# Patient Record
Sex: FEMALE | Race: WHITE | NOT HISPANIC OR LATINO | Employment: OTHER
[De-identification: names, ages, dates, MRNs, and addresses within clinical notes are randomized per-mention and may not be internally consistent; named-entity substitution may affect disease eponyms.]

---

## 2017-06-10 ENCOUNTER — HEALTH MAINTENANCE LETTER (OUTPATIENT)
Age: 62
End: 2017-06-10

## 2017-06-12 ENCOUNTER — HOSPITAL ENCOUNTER (OUTPATIENT)
Dept: MAMMOGRAPHY | Facility: CLINIC | Age: 62
Discharge: HOME | End: 2017-06-12
Attending: OBSTETRICS & GYNECOLOGY | Admitting: OBSTETRICS & GYNECOLOGY
Payer: COMMERCIAL

## 2017-06-12 DIAGNOSIS — Z12.31 VISIT FOR SCREENING MAMMOGRAM: ICD-10-CM

## 2017-06-12 PROCEDURE — 77063 BREAST TOMOSYNTHESIS BI: CPT

## 2017-10-02 ENCOUNTER — HOSPITAL ENCOUNTER (OUTPATIENT)
Facility: CLINIC | Age: 62
Discharge: HOME | End: 2017-10-02
Attending: COLON & RECTAL SURGERY | Admitting: COLON & RECTAL SURGERY
Payer: COMMERCIAL

## 2017-10-02 VITALS
DIASTOLIC BLOOD PRESSURE: 80 MMHG | WEIGHT: 145 LBS | OXYGEN SATURATION: 96 % | BODY MASS INDEX: 24.16 KG/M2 | SYSTOLIC BLOOD PRESSURE: 113 MMHG | HEART RATE: 60 BPM | RESPIRATION RATE: 15 BRPM | HEIGHT: 65 IN

## 2017-10-02 LAB — COLONOSCOPY: NORMAL

## 2017-10-02 PROCEDURE — 45378 DIAGNOSTIC COLONOSCOPY: CPT | Performed by: COLON & RECTAL SURGERY

## 2017-10-02 PROCEDURE — G0121 COLON CA SCRN NOT HI RSK IND: HCPCS | Performed by: COLON & RECTAL SURGERY

## 2017-10-02 PROCEDURE — G0500 MOD SEDAT ENDO SERVICE >5YRS: HCPCS | Performed by: COLON & RECTAL SURGERY

## 2017-10-02 PROCEDURE — 25000128 H RX IP 250 OP 636: Performed by: COLON & RECTAL SURGERY

## 2017-10-02 RX ORDER — ONDANSETRON 2 MG/ML
4 INJECTION INTRAMUSCULAR; INTRAVENOUS
Status: DISCONTINUED | OUTPATIENT
Start: 2017-10-02 | End: 2017-10-02 | Stop reason: HOSPADM

## 2017-10-02 RX ORDER — ONDANSETRON 2 MG/ML
INJECTION INTRAMUSCULAR; INTRAVENOUS PRN
Status: DISCONTINUED | OUTPATIENT
Start: 2017-10-02 | End: 2017-10-02 | Stop reason: HOSPADM

## 2017-10-02 RX ORDER — FENTANYL CITRATE 50 UG/ML
INJECTION, SOLUTION INTRAMUSCULAR; INTRAVENOUS PRN
Status: DISCONTINUED | OUTPATIENT
Start: 2017-10-02 | End: 2017-10-02 | Stop reason: HOSPADM

## 2017-10-02 RX ORDER — ONDANSETRON 2 MG/ML
4 INJECTION INTRAMUSCULAR; INTRAVENOUS EVERY 6 HOURS PRN
Status: DISCONTINUED | OUTPATIENT
Start: 2017-10-02 | End: 2017-10-02 | Stop reason: HOSPADM

## 2017-10-02 RX ORDER — FLUMAZENIL 0.1 MG/ML
0.2 INJECTION, SOLUTION INTRAVENOUS
Status: DISCONTINUED | OUTPATIENT
Start: 2017-10-02 | End: 2017-10-02 | Stop reason: HOSPADM

## 2017-10-02 RX ORDER — NALOXONE HYDROCHLORIDE 0.4 MG/ML
.1-.4 INJECTION, SOLUTION INTRAMUSCULAR; INTRAVENOUS; SUBCUTANEOUS
Status: DISCONTINUED | OUTPATIENT
Start: 2017-10-02 | End: 2017-10-02 | Stop reason: HOSPADM

## 2017-10-02 RX ORDER — ONDANSETRON 4 MG/1
4 TABLET, ORALLY DISINTEGRATING ORAL EVERY 6 HOURS PRN
Status: DISCONTINUED | OUTPATIENT
Start: 2017-10-02 | End: 2017-10-02 | Stop reason: HOSPADM

## 2017-10-02 RX ORDER — LIDOCAINE 40 MG/G
CREAM TOPICAL
Status: DISCONTINUED | OUTPATIENT
Start: 2017-10-02 | End: 2017-10-02 | Stop reason: HOSPADM

## 2017-10-02 NOTE — DISCHARGE INSTRUCTIONS

## 2017-10-02 NOTE — OP NOTE
See Provation Note In Chart    Flora Johns MD  [facility]  Office Phone # [phone]

## 2017-10-02 NOTE — IP AVS SNAPSHOT
MRN:[id number]                      After Visit Summary   10/2/2017    Marguerite Garcia    MRN: [id number]           Thank you!     Thank you for choosing [facility] for your care. Our goal is always to provide you with excellent care. Hearing back from our patients is one way we can continue to improve our services. Please take a few minutes to complete the written survey that you may receive in the mail after you visit. If you would like to speak to someone directly about your visit please contact Patient Relations at [phone]. Thank you!          Patient Information     Date Of Birth          1955        About your hospital stay     You were admitted on:  October 2, 2017 You last received care in the:  [facility]    You were discharged on:  October 2, 2017       Who to Call     For medical emergencies, please call 911.  For non-urgent questions about your medical care, please call your primary care provider or clinic, [phone]  For questions related to your surgery, please call your surgery clinic        Attending Provider     Provider Specialty    Flora Johns MD [facility] Rectal Surgery       Primary Care Provider Office Phone # Fax #    Rebeca Duque [phone]      Further instructions from your care team         Understanding Diverticulosis and Diverticulitis     Pouches or diverticula usually occur in the lower part of the colon called the sigmoid.      Diverticulitis occurs when the pouches become inflamed.     The colon (large intestine) is the last part of the digestive tract. It absorbs water from stool and changes it from a liquid to a solid. In certain cases, small pouches called diverticula can form in the colon wall. This condition is called diverticulosis. The pouches can become infected. If this happens, it becomes a more serious problem called diverticulitis. These problems can be painful. But they  can be managed.   Managing Your Condition  Diet changes or taking medications are often tried first. These may be enough to bring relief. If the case is bad, surgery may be done. You and your doctor can discuss the plan that is best for you.  If You Have Diverticulosis  Diet changes are often enough to control symptoms. The main changes are adding fiber (roughage) and drinking more water. Fiber absorbs water as it travels through your colon. This helps your stool stay soft and move smoothly. Water helps this process. If needed, you may be told to take over-the-counter stool softeners. To help relieve pain, antispasmodic medications may be prescribed.  If You Have Diverticulitis  Treatment depends on how bad your symptoms are.  For mild symptoms: You may be put on a liquid diet for a short time. You may also be prescribed antibiotics. If these two steps relieve your symptoms, you may then be prescribed a high-fiber diet. If you still have symptoms, your doctor will discuss further treatment options with you.  For severe symptoms: You may need to be admitted to the hospital. There, you can be given IV antibiotics and fluids. Once symptoms are under control, the above treatments may be tried. If these don t control your condition, your doctor may discuss the option of having surgery with you.  [facility] to Colon Health  Help keep your colon healthy with a diet that includes plenty of high-fiber fruits, vegetables, and whole grains. Drink plenty of liquids like water and juice. Your doctor may also recommend avoiding seeds and nuts.          [id number] [facility], [address] [place] [address], [place] [address]. All rights reserved. This information is not intended as a substitute for professional medical care. Always follow your healthcare professional's instructions.    Eating a High-Fiber Diet  Fiber is what gives strength and structure to plants. Most grains, beans, vegetables, and fruits contain fiber. Foods rich in fiber  are often low in calories and fat, and they fill you up more. They may also reduce your risks for certain health problems. To find out the amount of fiber in canned, packaged, or frozen foods, read the  Nutrition Facts  label. It tells you how much fiber is in a serving.      Types of Fiber and Their Benefits  There are two types of fiber: insoluble and soluble. They both aid digestion and help you maintain a healthy weight.  Insoluble fiber: This is found in whole grains, cereals, certain fruits and vegetables (such as apple skin, corn, and carrots). Insoluble fiber may prevent constipation and reduce the risk of certain types of cancer.   Soluble fiber: This type of fiber is in oats, beans, and certain fruits and vegetables (such as strawberries and peas). Soluble fiber can reduce cholesterol (which may help lower the risk of heart disease), and helps control blood sugar levels.  Look for High-Fiber Foods  Whole-grain breads and cereals: Try to eat 6-8 ounces a day. Include wheat and oat bran cereals, whole-wheat muffins or toast, and corn tortillas in your meals.  Fruits: Try to eat 2 cups a day. Apples, oranges, strawberries, pears, and bananas are good sources. (Note: Fruit juice is low in fiber.)  Vegetables: Try to eat 3 cups a day. Add asparagus, carrots, broccoli, peas, and corn to your meals.  Legumes (beans): One cup of cooked lentils gives you over 15 grams of fiber. Try navy beans, lentils, and chickpeas.  Seeds:  A small handful of seeds gives you about 3 grams of fiber. Try sunflower seeds.    Keep Track of Your Fiber  A healthy diet includes 31 grams of fiber a day if you have a 2,000-calorie diet. Keep track of how much fiber you eat. Start by reading food labels. Then eat a variety of foods high in fiber. Ask your doctor about supplemental fiber products.            [id number] Tanya Kenny, [address] [place] [address], [place] [address]. All rights reserved. This information is not intended as a  "substitute for professional medical care. Always follow your healthcare professional's instructions.    Pending Results     No orders found from 2017 to 10/3/2017.            Admission Information     Date & Time Provider Department Dept. Phone    10/2/2017 Flora Johns MD [facility] [phone]      Your Vitals Were     Blood Pressure Pulse Respirations Height Weight Pulse Oximetry    96/75 60 14 1.651 m (5' 5\") 65.8 kg (145 lb) 96%    BMI (Body Mass Index)                   24.13 kg/m2           [facility] Information     [facility] lets you send messages to your doctor, view your test results, renew your prescriptions, schedule appointments and more. To sign up, go to [email or web address] . Click on \"Log in\" on the left side of the screen, which will take you to the Welcome page. Then click on \"Sign up Now\" on the right side of the page.     You will be asked to enter the access code listed below, as well as some personal information. Please follow the directions to create your username and password.     Your access code is: [email or web address]  Expires: 2017 10:09 AM     Your access code will  in 90 days. If you need help or a new code, please call your [facility] clinic or [phone].        Care EveryWhere ID     This is your Care EveryWhere ID. This could be used by other organizations to access your [facility] medical records  [id number]        Equal Access to Services     CHACHO PRINGLE AH: Hadii velasquez angel hadasho Sonelaali, waaxda luqadaha, qaybta kaalmada danielegyada, tyler peña . So [facility] [phone].    ATENCIÓN: Si habla español, tiene a hendricks disposición servicios gratuitos de asistencia lingüística. Llame al [phone].    We comply with applicable federal civil rights laws and [place] laws. We do not discriminate on the basis of race, color, national origin, age, disability, sex, sexual orientation, or gender identity.               Review of your " medicines      CONTINUE these medicines which have NOT CHANGED        Dose / Directions    atenolol 50 MG tablet   Commonly known as:  TENORMIN        1 TABLET BID   Quantity:  60   Refills:  0       Calcium-Vitamin D 150-100 MG-UNIT Tabs        Take  by mouth.   Refills:  0       DITROPAN 5 MG Tabs        1 tab daily   Quantity:  42   Refills:  0       estradiol 0.075 MG/24HR BIW patch   Commonly known as:  VIVELLE-DOT        Dose:  1 patch   Place 1 patch onto the skin twice a week.   Refills:  0       LORazepam 0.5 MG tablet   Commonly known as:  ATIVAN   Used for:  Trigeminal neuralgia        Dose:  0.5 mg   Take 1 tablet (0.5 mg) by mouth daily as needed for anxiety   Quantity:  2 tablet   Refills:  0       PROGESTERONE        Dose:  50 mg   50 mg. Takes bid   Refills:  0       vitamin B complex with vitamin C Tabs tablet   Commonly known as:  STRESS TAB        Dose:  1 tablet   Take 1 tablet by mouth daily.   Refills:  0       VITAMIN D PO        Dose:  600 Units   Take 600 Units by mouth   Refills:  0                Protect others around you: Learn how to safely use, store and throw away your medicines at www.disposemymeds.org.             Medication List: This is a list of all your medications and when to take them. Check marks below indicate your daily home schedule. Keep this list as a reference.      Medications           Morning Afternoon Evening Bedtime As Needed    atenolol 50 MG tablet   Commonly known as:  TENORMIN   1 TABLET BID                                Calcium-Vitamin D 150-100 MG-UNIT Tabs   Take  by mouth.                                DITROPAN 5 MG Tabs   1 tab daily                                estradiol 0.075 MG/24HR BIW patch   Commonly known as:  VIVELLE-DOT   Place 1 patch onto the skin twice a week.                                LORazepam 0.5 MG tablet   Commonly known as:  ATIVAN   Take 1 tablet (0.5 mg) by mouth daily as needed for anxiety                                 PROGESTERONE   50 mg. Takes bid                                vitamin B complex with vitamin C Tabs tablet   Commonly known as:  STRESS TAB   Take 1 tablet by mouth daily.                                VITAMIN D PO   Take 600 Units by mouth
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
## 2017-10-02 NOTE — OR NURSING
Pt said she was feeling sleepy ,another iv fluids bolus started , at bed side ,pt said he nausea is better

## 2017-10-30 ENCOUNTER — TRANSFERRED RECORDS (OUTPATIENT)
Dept: HEALTH INFORMATION MANAGEMENT | Facility: CLINIC | Age: 62
End: 2017-10-30

## 2017-12-13 ENCOUNTER — OFFICE VISIT (OUTPATIENT)
Dept: PEDIATRICS | Facility: CLINIC | Age: 62
End: 2017-12-13
Payer: COMMERCIAL

## 2017-12-13 VITALS
HEART RATE: 58 BPM | DIASTOLIC BLOOD PRESSURE: 70 MMHG | TEMPERATURE: 97.6 F | OXYGEN SATURATION: 99 % | WEIGHT: 138.6 LBS | HEIGHT: 65 IN | SYSTOLIC BLOOD PRESSURE: 106 MMHG | BODY MASS INDEX: 23.09 KG/M2

## 2017-12-13 DIAGNOSIS — Z82.49 FAMILY HISTORY OF ABDOMINAL AORTIC ANEURYSM (AAA): ICD-10-CM

## 2017-12-13 DIAGNOSIS — I49.9 CARDIAC ARRHYTHMIA, UNSPECIFIED CARDIAC ARRHYTHMIA TYPE: Primary | ICD-10-CM

## 2017-12-13 PROCEDURE — 99214 OFFICE O/P EST MOD 30 MIN: CPT | Performed by: INTERNAL MEDICINE

## 2017-12-13 RX ORDER — METOPROLOL TARTRATE 50 MG
50 TABLET ORAL 2 TIMES DAILY
COMMUNITY
End: 2017-12-13

## 2017-12-13 RX ORDER — METOPROLOL TARTRATE 50 MG
50 TABLET ORAL 2 TIMES DAILY
Qty: 180 TABLET | Refills: 3 | Status: SHIPPED | OUTPATIENT
Start: 2017-12-13 | End: 2018-12-04

## 2017-12-13 NOTE — PROGRESS NOTES
SUBJECTIVE:   Marguerite Garcia is a 62 year old female who presents to clinic today for the following health issues:    Marguerite presents to the clinic for a medication follow up. Patient is taking Metoprolol 50 MG BID. Patient has a sinus arrythmia which was treated by [facility] since she was 39; patients mother and grandmother passed away from AAA. [facility] has been following every 5 years for imaging and refills. [facility] policy has changed and will not refill medication; she is looking for a provider to prescribe medications. She notes when she is stressed she will get an irregular beat but not otherwise. Patient has no PMHx of dislipidemia but has FHx of obesity and heart disease. BP in clinic was 106/70; BMI was 23.     Patient is followed by OB/GYN and is on Estrogen replacement. She endorses fatigue from menopause and is not ready to stop hormones. Patient is aware of the cardiac risks of estrogen replacement.      Medication Followup of Metoprolol    Taking Medication as prescribed: yes    Side Effects:  None    Medication Helping Symptoms:  Yes             Problem list and histories reviewed & adjusted, as indicated.  Additional history: as documented    Patient Active Problem List   Diagnosis     Healthcare maintenance     Cholelithiasis     Herniated disk     Lateral meniscus tear     Advanced directives, counseling/discussion     Urge incontinence     Sinus arrhythmia     Past Surgical History:   Procedure Laterality Date     ARTHROSCOPY KNEE RT/LT  2007    1.  Arthroscopic partial lateral meniscectomy, right knee.      C NONSPECIFIC PROCEDURE      Knee Surgery     C NONSPECIFIC PROCEDURE      Left breast bx     C NONSPECIFIC PROCEDURE      right breast bx     COLONOSCOPY Left 10/2/2017    Procedure: COLONOSCOPY;  Colonoscopy ;  Surgeon: Flora Johns MD;  Location:  GI     LAPAROSCOPIC CHOLECYSTECTOMY WITH CHOLANGIOGRAMS  8/7/2012    Procedure: LAPAROSCOPIC CHOLECYSTECTOMY WITH CHOLANGIOGRAMS;   LAPAROSCOPIC CHOLECYSTECTOMY WITH Intraoperative CHOLANGIOGRAMS ;  Surgeon: Elsi Bruno MD;  Location:  OR       Social History   Substance Use Topics     Smoking status: Never Smoker     Smokeless tobacco: Never Used     Alcohol use Yes      Comment: 1 glass of wine a month     Family History   Problem Relation Age of Onset     DIABETES Mother      Hypertension Mother      Obesity Mother      Hypertension Father      Lipids Father      Hypertension Sister      CEREBROVASCULAR DISEASE Sister      Obesity Sister      Thyroid Disease Sister      Breast Cancer No family hx of      Cancer - colorectal No family hx of          Current Outpatient Prescriptions   Medication Sig Dispense Refill     Magnesium 400 MG CAPS        metoprolol (LOPRESSOR) 50 MG tablet Take 50 mg by mouth 2 times daily       Cholecalciferol (VITAMIN D PO) Take 600 Units by mouth       PROGESTERONE 50 mg. Takes bid       estradiol (VIVELLE-DOT) 0.075 MG/24HR Place 1 patch onto the skin twice a week.       Calcium-Vitamin D 150-100 MG-UNIT TABS Take  by mouth.       B Complex Vitamins (VITAMIN  B COMPLEX) tablet Take 1 tablet by mouth daily.       DITROPAN 5 MG OR TABS 1 tab daily 42 0     LORazepam (ATIVAN) 0.5 MG tablet Take 1 tablet (0.5 mg) by mouth daily as needed for anxiety (Patient not taking: Reported on 12/13/2017) 2 tablet 0     ATENOLOL 50 MG OR TABS 1 TABLET BID (Patient not taking: Reported on 12/13/2017) 60 0     Allergies   Allergen Reactions     Penicillins Rash     Sulfa Drugs Rash     BP Readings from Last 3 Encounters:   12/13/17 106/70   10/02/17 113/80   11/22/16 96/56    Wt Readings from Last 3 Encounters:   12/13/17 62.9 kg (138 lb 9.6 oz)   10/02/17 65.8 kg (145 lb)   11/22/16 62.4 kg (137 lb 9.6 oz)               Labs reviewed in EPIC      Reviewed and updated as needed this visit by clinical staff     Reviewed and updated as needed this visit by Provider         ROS:  Constitutional, HEENT, cardiovascular,  "pulmonary, gi and gu systems are negative, except as otherwise noted.      This document serves as a record of the services and decisions personally performed and made by Rebeca Duque MD. It was created on her behalf by Alta Osei, a trained medical scribe. The creation of this document is based the provider's statements to the medical scribe.    Alta Osei December 13, 2017 11:45 AM  OBJECTIVE:   /70 (BP Location: Right arm, Patient Position: Chair, Cuff Size: Adult Regular)  Pulse 58  Temp 97.6  F (36.4  C) (Oral)  Ht 1.651 m (5' 5\")  Wt 62.9 kg (138 lb 9.6 oz)  LMP  (Approximate)  SpO2 99%  BMI 23.06 kg/m2  Body mass index is 23.06 kg/(m^2).  GENERAL: healthy, alert and no distress  HENT: ear canals and TM's normal, nose and mouth without ulcers or lesions  CV: regular rate and rhythm, normal S1 S2, no S3 or S4, no murmur, click or rub, no peripheral edema and peripheral pulses strong  ABDOMEN: soft, nontender, no hepatosplenomegaly, no masses and bowel sounds normal  PSYCH: mentation appears normal, affect normal/bright        Diagnostic Test Results:  none     ASSESSMENT/PLAN:   (I49.9) Cardiac arrhythmia, unspecified cardiac arrhythmia type  (primary encounter diagnosis)  -- unclear what arrhythmia [facility] has been treating, will get records  -- requesting physician to refill medication since [facility] will no longer fill   -- reviewed potential side effects of medication and red flag sx to come into clinic   -- signed release forms for records from [facility]   -- patient will need to see me yearly for refills   Plan: metoprolol (LOPRESSOR) 50 MG tablet            (Z82.49) Family history of abdominal aortic aneurysm (AAA)  -- followed by [facility] Q5 years; no abnormal findings     Follow up for annual care or as needed     The information in this document, created by the medical scribe for me, accurately reflects the services I personally performed and the decisions made by me. I have reviewed " and approved this document for accuracy prior to leaving the patient care area.  Rebeca Duque MD  [facility]

## 2017-12-13 NOTE — MR AVS SNAPSHOT
"              After Visit Summary   2017    Marguerite Garcia    MRN: [id number]           Patient Information     Date Of Birth          1955        Visit Information        Provider Department      2017 11:40 AM Rebeca Duque MD [facility]        Today's Diagnoses     Cardiac arrhythmia, unspecified cardiac arrhythmia type    -  1    Family history of abdominal aortic aneurysm (AAA)           Follow-ups after your visit        Who to contact     If you have questions or need follow up information about today's clinic visit or your schedule please contact [facility] directly at [phone].  Normal or non-critical lab and imaging results will be communicated to you by [employer], letter or phone within 4 business days after the clinic has received the results. If you do not hear from us within 7 days, please contact the clinic through [employer] or phone. If you have a critical or abnormal lab result, we will notify you by phone as soon as possible.  Submit refill requests through [employer] or call your pharmacy and they will forward the refill request to us. Please allow 3 business days for your refill to be completed.          Additional Information About Your Visit        MyChart Information     [employer] lets you send messages to your doctor, view your test results, renew your prescriptions, schedule appointments and more. To sign up, go to [email or web address] . Click on \"Log in\" on the left side of the screen, which will take you to the Welcome page. Then click on \"Sign up Now\" on the right side of the page.     You will be asked to enter the access code listed below, as well as some personal information. Please follow the directions to create your username and password.     Your access code is: [email or web address]  Expires: 2017  9:09 AM     Your access code will  in 90 days. If you need help or a new code, please call your [place] clinic or [phone].   " "     Care EveryWhere ID     This is your Care EveryWhere ID. This could be used by other organizations to access your [facility] medical records  [id number]        Your Vitals Were     Pulse Temperature Height Last Period Pulse Oximetry BMI (Body Mass Index)    58 97.6  F (36.4  C) (Oral) 5' 5\" (1.651 m) (Approximate) 99% 23.06 kg/m2       Blood Pressure from Last 3 Encounters:   12/13/17 106/70   10/02/17 113/80   11/22/16 96/56    Weight from Last 3 Encounters:   12/13/17 138 lb 9.6 oz (62.9 kg)   10/02/17 145 lb (65.8 kg)   11/22/16 137 lb 9.6 oz (62.4 kg)              Today, you had the following     No orders found for display         Where to get your medicines      These medications were sent to [id number] - [place] - [address]  [address], [place] [address]     Phone:  [phone]     metoprolol 50 MG tablet          Primary Care Provider Office Phone # Fax #    Rebeca Duque [phone] [address] [place] [address]        Equal Access to Services     [facility]: Hadii aad ku hadasho Soomaali, waaxda luqadaha, qaybta kaalmada adeegyada, waxay idiin haynam peña . [facility] [phone].    ATENCIÓN: Si habla español, tiene a hendricks disposición servicios gratuitos de asistencia lingüística. ame al [phone].    We comply with applicable federal civil rights laws and [place] laws. We do not discriminate on the basis of race, color, national origin, age, disability, sex, sexual orientation, or gender identity.            Thank you!     Thank you for choosing [facility]  for your care. Our goal is always to provide you with excellent care. Hearing back from our patients is one way we can continue to improve our services. Please take a few minutes to complete the written survey that you may receive in the mail after your visit with us. Thank you!           "   Your Updated Medication List - Protect others around you: Learn how to safely use, store and throw away your medicines at www.disposemymeds.org.          This list is accurate as of: 12/13/17 12:06 PM.  Always use your most recent med list.                   Brand Name Dispense Instructions for use Diagnosis    atenolol 50 MG tablet    TENORMIN    60    1 TABLET BID        Calcium-Vitamin D 150-100 MG-UNIT Tabs      Take  by mouth.        DITROPAN 5 MG Tabs     42    1 tab daily        estradiol 0.075 MG/24HR BIW patch    VIVELLE-DOT     Place 1 patch onto the skin twice a week.        LORazepam 0.5 MG tablet    ATIVAN    2 tablet    Take 1 tablet (0.5 mg) by mouth daily as needed for anxiety    Trigeminal neuralgia       Magnesium 400 MG Caps           metoprolol 50 MG tablet    LOPRESSOR    180 tablet    Take 1 tablet (50 mg) by mouth 2 times daily    Cardiac arrhythmia, unspecified cardiac arrhythmia type       PROGESTERONE      50 mg. Takes bid        vitamin B complex with vitamin C Tabs tablet    STRESS TAB     Take 1 tablet by mouth daily.        VITAMIN D PO      Take 600 Units by mouth

## 2017-12-13 NOTE — NURSING NOTE
"Chief Complaint   Patient presents with     Recheck Medication       Initial /70 (BP Location: Right arm, Patient Position: Chair, Cuff Size: Adult Regular)  Pulse 58  Temp 97.6  F (36.4  C) (Oral)  Ht 5' 5\" (1.651 m)  Wt 138 lb 9.6 oz (62.9 kg)  LMP  (Approximate)  SpO2 99%  BMI 23.06 kg/m2 Estimated body mass index is 23.06 kg/(m^2) as calculated from the following:    Height as of this encounter: 5' 5\" (1.651 m).    Weight as of this encounter: 138 lb 9.6 oz (62.9 kg).  Medication Reconciliation: complete   Rosy Su MA 11:35 AM 12/13/2017     "

## 2018-05-01 ENCOUNTER — TRANSFERRED RECORDS (OUTPATIENT)
Dept: HEALTH INFORMATION MANAGEMENT | Facility: CLINIC | Age: 63
End: 2018-05-01

## 2018-05-01 LAB — PAP SMEAR - HIM PATIENT REPORTED: NEGATIVE

## 2018-06-07 ENCOUNTER — TRANSFERRED RECORDS (OUTPATIENT)
Dept: HEALTH INFORMATION MANAGEMENT | Facility: CLINIC | Age: 63
End: 2018-06-07

## 2018-06-18 ENCOUNTER — HOSPITAL ENCOUNTER (OUTPATIENT)
Dept: MAMMOGRAPHY | Facility: CLINIC | Age: 63
Discharge: HOME | End: 2018-06-18
Attending: OBSTETRICS & GYNECOLOGY | Admitting: OBSTETRICS & GYNECOLOGY
Payer: COMMERCIAL

## 2018-06-18 DIAGNOSIS — Z12.31 VISIT FOR SCREENING MAMMOGRAM: ICD-10-CM

## 2018-06-18 PROCEDURE — 77067 SCR MAMMO BI INCL CAD: CPT

## 2018-07-07 ENCOUNTER — HEALTH MAINTENANCE LETTER (OUTPATIENT)
Age: 63
End: 2018-07-07

## 2018-12-04 DIAGNOSIS — I49.9 CARDIAC ARRHYTHMIA, UNSPECIFIED CARDIAC ARRHYTHMIA TYPE: ICD-10-CM

## 2018-12-04 NOTE — TELEPHONE ENCOUNTER
"Requested Prescriptions   Pending Prescriptions Disp Refills     metoprolol tartrate (LOPRESSOR) 50 MG tablet [Pharmacy Med Name: METOPROLOL TARTRATE 50MG  TABLETS]  Last Written Prescription Date:  12/13/2017  Last Fill Quantity: 180,  # refills: 3   Last office visit: 12/13/2017 with prescribing provider:  Rebeca Duque     Future Office Visit:     180 tablet 0     Sig: TAKE 1 TABLET(50 MG) BY MOUTH TWICE DAILY    Beta-Blockers Protocol Passed    12/4/2018  3:27 AM       Passed - Blood pressure under 140/90 in past 12 months    BP Readings from Last 3 Encounters:   12/13/17 106/70   10/02/17 113/80   11/22/16 96/56                Passed - Patient is age 6 or older       Passed - Recent (12 mo) or future (30 days) visit within the authorizing provider's specialty    Patient had office visit in the last 12 months or has a visit in the next 30 days with authorizing provider or within the authorizing provider's specialty.  See \"Patient Info\" tab in inbasket, or \"Choose Columns\" in Meds & Orders section of the refill encounter.                "

## 2018-12-05 RX ORDER — METOPROLOL TARTRATE 50 MG
TABLET ORAL
Qty: 60 TABLET | Refills: 0 | Status: SHIPPED | OUTPATIENT
Start: 2018-12-05 | End: 2018-12-18

## 2018-12-05 NOTE — TELEPHONE ENCOUNTER
Medication is being filled for 1 time refill only due to:  Will need appointment 12/18.  Pharmacy informed.  /Alyssa Rivas RN

## 2018-12-18 ENCOUNTER — OFFICE VISIT (OUTPATIENT)
Dept: PEDIATRICS | Facility: CLINIC | Age: 63
End: 2018-12-18
Payer: COMMERCIAL

## 2018-12-18 VITALS
BODY MASS INDEX: 22.91 KG/M2 | HEART RATE: 60 BPM | DIASTOLIC BLOOD PRESSURE: 64 MMHG | HEIGHT: 65 IN | TEMPERATURE: 97.4 F | WEIGHT: 137.5 LBS | SYSTOLIC BLOOD PRESSURE: 112 MMHG | OXYGEN SATURATION: 98 %

## 2018-12-18 DIAGNOSIS — Z82.49 FAMILY HISTORY OF ABDOMINAL AORTIC ANEURYSM (AAA): ICD-10-CM

## 2018-12-18 DIAGNOSIS — Z00.00 HEALTH CARE MAINTENANCE: Primary | ICD-10-CM

## 2018-12-18 DIAGNOSIS — I47.20 VENTRICULAR TACHYCARDIA (H): ICD-10-CM

## 2018-12-18 DIAGNOSIS — N95.1 MENOPAUSAL SYNDROME (HOT FLASHES): ICD-10-CM

## 2018-12-18 PROCEDURE — 99396 PREV VISIT EST AGE 40-64: CPT | Performed by: NURSE PRACTITIONER

## 2018-12-18 RX ORDER — METOPROLOL TARTRATE 50 MG
TABLET ORAL
Qty: 180 TABLET | Refills: 3 | Status: SHIPPED | OUTPATIENT
Start: 2018-12-18 | End: 2020-01-13

## 2018-12-18 RX ORDER — ESTRADIOL 0.07 MG/D
1 FILM, EXTENDED RELEASE TRANSDERMAL
Status: CANCELLED | OUTPATIENT
Start: 2018-12-20

## 2018-12-18 RX ORDER — LORAZEPAM 0.5 MG/1
0.5 TABLET ORAL DAILY PRN
Qty: 2 TABLET | Refills: 0 | Status: CANCELLED | OUTPATIENT
Start: 2018-12-18

## 2018-12-18 ASSESSMENT — ENCOUNTER SYMPTOMS
BREAST MASS: 0
JOINT SWELLING: 0
HEARTBURN: 0
DYSURIA: 0
NAUSEA: 0
WEAKNESS: 0
PALPITATIONS: 0
DIZZINESS: 0
FREQUENCY: 0
DIARRHEA: 0
CHILLS: 0
SHORTNESS OF BREATH: 0
ABDOMINAL PAIN: 0
PARESTHESIAS: 0
EYE PAIN: 0
NERVOUS/ANXIOUS: 0
HEADACHES: 0
CONSTIPATION: 0
SORE THROAT: 0
MYALGIAS: 0
HEMATOCHEZIA: 0
ARTHRALGIAS: 0
HEMATURIA: 0
COUGH: 0
FEVER: 0

## 2018-12-18 ASSESSMENT — MIFFLIN-ST. JEOR: SCORE: 1171.64
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
## 2019-02-11 ENCOUNTER — TELEPHONE (OUTPATIENT)
Dept: PEDIATRICS | Facility: CLINIC | Age: 64
End: 2019-02-11

## 2019-02-11 NOTE — TELEPHONE ENCOUNTER
Panel Management Review      Patient has the following on her problem list: None      Composite cancer screening  Chart review shows that this patient is due/due soon for the following Pap Smear  Summary:    Patient is due/failing the following:   PAP and PHYSICAL    Action needed:   Patient needs office visit for pap & physical.    Type of outreach:    Phone, spoke to patient.  Patient states she had pap done at [facility] May [facility] - Normal result.    Questions for provider review:    None                                                                                                                                  Nan Garcia CMA    Chart closed .

## 2019-06-24 ENCOUNTER — HOSPITAL ENCOUNTER (OUTPATIENT)
Dept: MAMMOGRAPHY | Facility: CLINIC | Age: 64
Discharge: HOME | End: 2019-06-24
Attending: OBSTETRICS & GYNECOLOGY | Admitting: OBSTETRICS & GYNECOLOGY
Payer: COMMERCIAL

## 2019-06-24 DIAGNOSIS — Z12.31 VISIT FOR SCREENING MAMMOGRAM: ICD-10-CM

## 2019-06-24 PROCEDURE — 77063 BREAST TOMOSYNTHESIS BI: CPT

## 2019-07-02 ENCOUNTER — TRANSFERRED RECORDS (OUTPATIENT)
Dept: HEALTH INFORMATION MANAGEMENT | Facility: CLINIC | Age: 64
End: 2019-07-02

## 2020-01-30 ENCOUNTER — OFFICE VISIT (OUTPATIENT)
Dept: PEDIATRICS | Facility: CLINIC | Age: 65
End: 2020-01-30
Payer: COMMERCIAL

## 2020-01-30 VITALS
RESPIRATION RATE: 18 BRPM | OXYGEN SATURATION: 98 % | SYSTOLIC BLOOD PRESSURE: 102 MMHG | TEMPERATURE: 97.9 F | BODY MASS INDEX: 22.46 KG/M2 | DIASTOLIC BLOOD PRESSURE: 64 MMHG | WEIGHT: 134.8 LBS | HEART RATE: 56 BPM | HEIGHT: 65 IN

## 2020-01-30 DIAGNOSIS — N95.0 POST-MENOPAUSAL BLEEDING: ICD-10-CM

## 2020-01-30 DIAGNOSIS — I47.20 VENTRICULAR TACHYCARDIA (H): ICD-10-CM

## 2020-01-30 DIAGNOSIS — A09 TRAVELER'S DIARRHEA: ICD-10-CM

## 2020-01-30 DIAGNOSIS — Z00.00 ROUTINE GENERAL MEDICAL EXAMINATION AT A HEALTH CARE FACILITY: Primary | ICD-10-CM

## 2020-01-30 DIAGNOSIS — Z83.49 FAMILY HISTORY OF HYPOTHYROIDISM: ICD-10-CM

## 2020-01-30 PROCEDURE — 99396 PREV VISIT EST AGE 40-64: CPT | Performed by: NURSE PRACTITIONER

## 2020-01-30 RX ORDER — METOPROLOL TARTRATE 50 MG
TABLET ORAL
Qty: 180 TABLET | Refills: 4 | Status: SHIPPED | OUTPATIENT
Start: 2020-01-30 | End: 2020-02-11

## 2020-01-30 RX ORDER — AZITHROMYCIN 250 MG/1
TABLET, FILM COATED ORAL
Qty: 6 TABLET | Refills: 0 | Status: SHIPPED | OUTPATIENT
Start: 2020-01-30 | End: 2020-02-04

## 2020-01-30 SDOH — HEALTH STABILITY: MENTAL HEALTH: HOW OFTEN DO YOU HAVE A DRINK CONTAINING ALCOHOL?: MONTHLY OR LESS

## 2020-01-30 SDOH — HEALTH STABILITY: MENTAL HEALTH: HOW MANY STANDARD DRINKS CONTAINING ALCOHOL DO YOU HAVE ON A TYPICAL DAY?: 1 OR 2

## 2020-01-30 ASSESSMENT — ENCOUNTER SYMPTOMS
SHORTNESS OF BREATH: 0
FREQUENCY: 0
MYALGIAS: 0
PALPITATIONS: 0
HEADACHES: 0
CONSTIPATION: 0
DYSURIA: 0
FEVER: 0
ABDOMINAL PAIN: 0
JOINT SWELLING: 0
CHILLS: 0
COUGH: 0
HEMATOCHEZIA: 0
DIZZINESS: 0
DIARRHEA: 0
EYE PAIN: 0
WEAKNESS: 0
ARTHRALGIAS: 0
BREAST MASS: 0
HEARTBURN: 0
NAUSEA: 0
SORE THROAT: 0
NERVOUS/ANXIOUS: 0
HEMATURIA: 0
PARESTHESIAS: 0

## 2020-01-30 ASSESSMENT — MIFFLIN-ST. JEOR: SCORE: 1154.39
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
## 2020-02-03 DIAGNOSIS — Z00.00 ROUTINE GENERAL MEDICAL EXAMINATION AT A HEALTH CARE FACILITY: ICD-10-CM

## 2020-02-03 DIAGNOSIS — Z83.49 FAMILY HISTORY OF HYPOTHYROIDISM: ICD-10-CM

## 2020-02-03 LAB
ANION GAP SERPL CALCULATED.3IONS-SCNC: 4 MMOL/L (ref 3–14)
BUN SERPL-MCNC: 18 MG/DL (ref 7–30)
CALCIUM SERPL-MCNC: 9.8 MG/DL (ref 8.5–10.1)
CHLORIDE SERPL-SCNC: 104 MMOL/L (ref 94–109)
CHOLEST SERPL-MCNC: 206 MG/DL
CO2 SERPL-SCNC: 29 MMOL/L (ref 20–32)
CREAT SERPL-MCNC: 1.03 MG/DL (ref 0.52–1.04)
GFR SERPL CREATININE-BSD FRML MDRD: 57 ML/MIN/{1.73_M2}
GLUCOSE SERPL-MCNC: 88 MG/DL (ref 70–99)
HDLC SERPL-MCNC: 71 MG/DL
LDLC SERPL CALC-MCNC: 123 MG/DL
NONHDLC SERPL-MCNC: 135 MG/DL
POTASSIUM SERPL-SCNC: 4.1 MMOL/L (ref 3.4–5.3)
SODIUM SERPL-SCNC: 137 MMOL/L (ref 133–144)
T4 FREE SERPL-MCNC: 0.97 NG/DL (ref 0.76–1.46)
TRIGL SERPL-MCNC: 61 MG/DL
TSH SERPL DL<=0.005 MIU/L-ACNC: 5.75 MU/L (ref 0.4–4)

## 2020-02-03 PROCEDURE — 84443 ASSAY THYROID STIM HORMONE: CPT | Performed by: NURSE PRACTITIONER

## 2020-02-03 PROCEDURE — 80061 LIPID PANEL: CPT | Performed by: NURSE PRACTITIONER

## 2020-02-03 PROCEDURE — 80048 BASIC METABOLIC PNL TOTAL CA: CPT | Performed by: NURSE PRACTITIONER

## 2020-02-03 PROCEDURE — 84439 ASSAY OF FREE THYROXINE: CPT | Performed by: NURSE PRACTITIONER

## 2020-02-03 PROCEDURE — 36415 COLL VENOUS BLD VENIPUNCTURE: CPT | Performed by: NURSE PRACTITIONER

## 2020-02-11 DIAGNOSIS — I47.20 VENTRICULAR TACHYCARDIA (H): ICD-10-CM

## 2020-02-11 RX ORDER — METOPROLOL TARTRATE 50 MG
TABLET ORAL
Qty: 180 TABLET | Refills: 4 | Status: SHIPPED | OUTPATIENT
Start: 2020-02-11 | End: 2021-01-21

## 2020-02-11 NOTE — TELEPHONE ENCOUNTER
Prescription approved per [facility] Refill Protocol.  Hailee RUSHING RN, BSN

## 2020-08-12 ENCOUNTER — TRANSFERRED RECORDS (OUTPATIENT)
Dept: HEALTH INFORMATION MANAGEMENT | Facility: CLINIC | Age: 65
End: 2020-08-12

## 2020-10-01 ENCOUNTER — TELEPHONE (OUTPATIENT)
Dept: PEDIATRICS | Facility: CLINIC | Age: 65
End: 2020-10-01

## 2020-10-01 ENCOUNTER — OFFICE VISIT (OUTPATIENT)
Dept: PEDIATRICS | Facility: CLINIC | Age: 65
End: 2020-10-01
Payer: COMMERCIAL

## 2020-10-01 VITALS
BODY MASS INDEX: 22.66 KG/M2 | OXYGEN SATURATION: 99 % | HEART RATE: 56 BPM | SYSTOLIC BLOOD PRESSURE: 110 MMHG | DIASTOLIC BLOOD PRESSURE: 73 MMHG | WEIGHT: 134.1 LBS | TEMPERATURE: 96.8 F | RESPIRATION RATE: 16 BRPM

## 2020-10-01 DIAGNOSIS — Z86.79 HISTORY OF SUPRAVENTRICULAR TACHYCARDIA: ICD-10-CM

## 2020-10-01 DIAGNOSIS — M17.32 POST-TRAUMATIC OSTEOARTHRITIS OF LEFT KNEE: ICD-10-CM

## 2020-10-01 DIAGNOSIS — Z01.818 PRE-OPERATIVE EXAMINATION: Primary | ICD-10-CM

## 2020-10-01 LAB
ANION GAP SERPL CALCULATED.3IONS-SCNC: 10 MMOL/L (ref 3–14)
BASOPHILS # BLD AUTO: 0.1 10E9/L (ref 0–0.2)
BASOPHILS NFR BLD AUTO: 0.7 %
BUN SERPL-MCNC: 13 MG/DL (ref 7–30)
CALCIUM SERPL-MCNC: 9.8 MG/DL (ref 8.5–10.1)
CHLORIDE SERPL-SCNC: 100 MMOL/L (ref 94–109)
CO2 SERPL-SCNC: 30 MMOL/L (ref 20–32)
CREAT SERPL-MCNC: 1 MG/DL (ref 0.52–1.04)
DIFFERENTIAL METHOD BLD: NORMAL
EOSINOPHIL # BLD AUTO: 0.2 10E9/L (ref 0–0.7)
EOSINOPHIL NFR BLD AUTO: 2.2 %
ERYTHROCYTE [DISTWIDTH] IN BLOOD BY AUTOMATED COUNT: 13 % (ref 10–15)
GFR SERPL CREATININE-BSD FRML MDRD: 58 ML/MIN/{1.73_M2}
GLUCOSE SERPL-MCNC: 91 MG/DL (ref 70–99)
HCT VFR BLD AUTO: 41.8 % (ref 35–47)
HGB BLD-MCNC: 13.8 G/DL (ref 11.7–15.7)
LYMPHOCYTES # BLD AUTO: 1.9 10E9/L (ref 0.8–5.3)
LYMPHOCYTES NFR BLD AUTO: 24.9 %
MCH RBC QN AUTO: 30.5 PG (ref 26.5–33)
MCHC RBC AUTO-ENTMCNC: 33 G/DL (ref 31.5–36.5)
MCV RBC AUTO: 92 FL (ref 78–100)
MONOCYTES # BLD AUTO: 0.6 10E9/L (ref 0–1.3)
MONOCYTES NFR BLD AUTO: 7.8 %
NEUTROPHILS # BLD AUTO: 4.8 10E9/L (ref 1.6–8.3)
NEUTROPHILS NFR BLD AUTO: 64.4 %
PLATELET # BLD AUTO: 193 10E9/L (ref 150–450)
POTASSIUM SERPL-SCNC: 4.5 MMOL/L (ref 3.4–5.3)
RBC # BLD AUTO: 4.53 10E12/L (ref 3.8–5.2)
SODIUM SERPL-SCNC: 140 MMOL/L (ref 133–144)
WBC # BLD AUTO: 7.4 10E9/L (ref 4–11)

## 2020-10-01 PROCEDURE — 36415 COLL VENOUS BLD VENIPUNCTURE: CPT | Performed by: FAMILY MEDICINE

## 2020-10-01 PROCEDURE — 99214 OFFICE O/P EST MOD 30 MIN: CPT | Performed by: FAMILY MEDICINE

## 2020-10-01 PROCEDURE — 80048 BASIC METABOLIC PNL TOTAL CA: CPT | Performed by: FAMILY MEDICINE

## 2020-10-01 PROCEDURE — 93000 ELECTROCARDIOGRAM COMPLETE: CPT | Performed by: FAMILY MEDICINE

## 2020-10-01 PROCEDURE — 85025 COMPLETE CBC W/AUTO DIFF WBC: CPT | Performed by: FAMILY MEDICINE
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
## 2020-10-14 ENCOUNTER — OFFICE VISIT (OUTPATIENT)
Dept: PEDIATRICS | Facility: CLINIC | Age: 65
End: 2020-10-14
Payer: COMMERCIAL

## 2020-10-14 VITALS
HEIGHT: 65 IN | TEMPERATURE: 97.4 F | BODY MASS INDEX: 22.82 KG/M2 | OXYGEN SATURATION: 97 % | WEIGHT: 137 LBS | RESPIRATION RATE: 14 BRPM | DIASTOLIC BLOOD PRESSURE: 62 MMHG | SYSTOLIC BLOOD PRESSURE: 110 MMHG | HEART RATE: 66 BPM

## 2020-10-14 DIAGNOSIS — Z96.652 S/P TOTAL KNEE REPLACEMENT, LEFT: Primary | ICD-10-CM

## 2020-10-14 LAB — HGB BLD-MCNC: 12 G/DL (ref 11.7–15.7)

## 2020-10-14 PROCEDURE — 99214 OFFICE O/P EST MOD 30 MIN: CPT | Performed by: FAMILY MEDICINE

## 2020-10-14 PROCEDURE — 36415 COLL VENOUS BLD VENIPUNCTURE: CPT | Performed by: FAMILY MEDICINE

## 2020-10-14 PROCEDURE — 85018 HEMOGLOBIN: CPT | Performed by: FAMILY MEDICINE

## 2020-10-14 RX ORDER — OXYCODONE HYDROCHLORIDE 5 MG/1
5-10 TABLET ORAL EVERY 4 HOURS PRN
COMMUNITY
Start: 2020-10-08 | End: 2021-11-30

## 2020-10-14 RX ORDER — ESTRADIOL 0.1 MG/G
CREAM VAGINAL AT BEDTIME
COMMUNITY
Start: 2020-09-24 | End: 2022-11-08

## 2020-10-14 RX ORDER — CELECOXIB 200 MG/1
200 CAPSULE ORAL 2 TIMES DAILY WITH MEALS
COMMUNITY
Start: 2020-10-08 | End: 2021-11-30

## 2020-10-14 ASSESSMENT — ENCOUNTER SYMPTOMS
LIGHT-HEADEDNESS: 0
SHORTNESS OF BREATH: 0
CHILLS: 0
VOMITING: 0
FEVER: 0
NAUSEA: 0

## 2020-10-14 ASSESSMENT — MIFFLIN-ST. JEOR: SCORE: 1164.37

## 2020-10-14 NOTE — PROGRESS NOTES
Chief Complaint   Patient presents with     Follow Up     surgery     Marguerite Garcia is a 64 year old female who presents to clinic today for the following health issues:    HPI           Hospital/Post-op Follow-up Visit:    Hospital/Nursing Home/IP Rehab Facility:  [facility]  Date of Admission: 10/07/2020  Date of Discharge: 10/08/2020  Reason(s) for Admission: Surgery - Left total knee replacement       Was your hospitalization related to COVID-19? No   Problems taking medications regularly:  No.    Medication changes since discharge: YES - Updated in Epic.  Diarrhea has resolved since stopping Senokot-S.  Problems adhering to non-medication therapy:  None    Summary of hospitalization:  [facility] Discharge Summary Reviewed.  Metoprolol was continued for her history of PSVT.  Diagnostic Tests/Treatments reviewed: Hemoglobin was 12.7 prior to discharge.  Preoperative Hemoglobin was 13.8.  Follow up needed:  Primary care within 5 days.  Surgeon's office 11-14 days postoperatively.  Other Healthcare Providers Involved in Patient s Care:         Surgical follow-up appointment - Patient has an appointment in 1 week.  Update since discharge:  The patient states she is improving.  Bruising involving her left knee and calf seems to have stabilized, but the patient requests to have her Hemoglobin rechecked.  She is taking Aspirin 81 mg twice daily, as given for DVT prophylaxis.  No chest pain, shortness of breath, or lightheadedness reported.  Patient is working with Physical Therapy twice per week, which is going well.  Pain is currently 3/10 severity at rest, up to 7/10 severity with movement.  Patient is managing her pain well with Tylenol and Celebrex, only using Oxycodone 10 mg once in the past 24 hours.  Patient states she has #12 Oxycodone left at this time.    Post Discharge Medication Reconciliation:  Discharge medications reconciled and discussed with patient.  Senokot-S has been  discontinued.    Plan of care communicated with patient.          Review of Systems   Constitutional: Negative for chills and fever.   Respiratory: Negative for shortness of breath.    Cardiovascular: Negative for chest pain and palpitations.   Gastrointestinal: Negative for nausea and vomiting.   Neurological: Negative for light-headedness.       Patient Active Problem List   Diagnosis     Healthcare maintenance     Herniated disk     Lateral meniscus tear     Advance care planning     Urge incontinence     Sinus arrhythmia     Post-menopausal bleeding     Past Surgical History:   Procedure Laterality Date     ARTHROSCOPY KNEE RT/LT  01/01/2007    1.  Arthroscopic partial lateral meniscectomy, right knee.      COLONOSCOPY Left 10/02/2017    Procedure: COLONOSCOPY;  Colonoscopy ;  Surgeon: Flora Johns MD;  Location: [facility]     LAPAROSCOPIC CHOLECYSTECTOMY WITH CHOLANGIOGRAMS  08/07/2012    Procedure: LAPAROSCOPIC CHOLECYSTECTOMY WITH CHOLANGIOGRAMS;  LAPAROSCOPIC CHOLECYSTECTOMY WITH Intraoperative CHOLANGIOGRAMS ;  Surgeon: Elsi Bruno MD;  Location: [facility] OR     FL TOTAL KNEE ARTHROPLASTY Left 10/07/2020     ZZC NONSPECIFIC PROCEDURE      Knee Surgery     ZZC NONSPECIFIC PROCEDURE      Left breast bx     ZZC NONSPECIFIC PROCEDURE      right breast bx       Social History     Tobacco Use     Smoking status: Never Smoker     Smokeless tobacco: Never Used   Substance Use Topics     Alcohol use: Yes     Frequency: Monthly or less     Drinks per session: 1 or 2     Comment: 1 glass of wine a month     Family History   Problem Relation Age of Onset     Diabetes Mother      Hypertension Mother      Obesity Mother      Hypertension Father      Lipids Father      Hypertension Sister      Cerebrovascular Disease Sister      Obesity Sister      Thyroid Disease Sister      Breast Cancer No family hx of      Cancer - colorectal No family hx of          Current Outpatient Medications   Medication Sig Dispense  "Refill     acetaminophen (TYLENOL) 500 MG tablet Take 500-1,000 mg by mouth every 6 hours as needed       aspirin (ASA) 81 MG EC tablet Take 81 mg by mouth 2 times daily       B Complex Vitamins (VITAMIN  B COMPLEX) tablet Take 1 tablet by mouth daily.       Calcium Carbonate (CALCIUM 600 PO) Take 1 tablet by mouth 2 times daily       celecoxib (CELEBREX) 200 MG capsule Take 200 mg by mouth 2 times daily (with meals)       estradiol (ESTRACE) 0.1 MG/GM vaginal cream Place vaginally At Bedtime       estradiol (VIVELLE-DOT) 0.025 MG/24HR bi-weekly patch Place 1 patch onto the skin twice a week       magnesium 250 MG tablet Take 1 tablet (250 mg) by mouth At Bedtime       metoprolol tartrate (LOPRESSOR) 50 MG tablet TAKE 1 TABLET(50 MG) BY MOUTH TWICE DAILY 180 tablet 4     oxybutynin ER (DITROPAN-XL) 5 MG 24 hr tablet Take 1 tablet by mouth daily       oxyCODONE (ROXICODONE) 5 MG tablet Take 5-10 mg by mouth every 4 hours as needed       progesterone (PROMETRIUM) 100 MG capsule Patient reports taking 50 mg daily.       Vitamin D, Cholecalciferol, 25 MCG (1000 UT) TABS Take 1 tablet (1,000 Units) by mouth daily           Allergies   Allergen Reactions     Penicillins Rash     Sulfa Drugs Rash       OBJECTIVE  /62 (BP Location: Right arm, Patient Position: Chair, Cuff Size: Adult Regular)   Pulse 66   Temp 97.4  F (36.3  C) (Tympanic)   Resp 14   Ht 1.638 m (5' 4.5\")   Wt 62.1 kg (137 lb)   SpO2 97%   BMI 23.15 kg/m      Physical Exam    GENERAL APPEARANCE:  Awake, alert, and in no acute distress.  PSYCHIATRIC:  Pleasant affect.  HEENT:  Wears glasses.  Sclera anicteric.  No conjunctivitis.  No obvious nasal congestion.  No erythema, edema, or exudates of the oral mucosa or posterior pharynx.  Mucous membranes moist.  NECK:  Spontaneous full range of motion.  No thyromegaly or mass.  No lymphadenopathy.  HEART:  Normal S1, S2.  Regular rate and rhythm.  No murmurs, rubs, or gallops.  LUNGS:  No respiratory " distress.  No wheezes, rales, or rhonchi.  ABDOMEN:  Not distended.  Soft.  Not tender.  No mass.  EXTREMITIES:  Moves 4 extremities.   No calf edema, erythema, or tenderness.  LEFT KNEE:  Bandages are in place, post recent surgery.  Point tenderness to palpation noted just lateral to the bandage superiorly.  Resolving bruising noted involving the knee and calf.  No erythema, drainage, or bleeding.  Distal pulses intact.  Capillary refill < 2 seconds.  NEUROLOGIC:  Partially weight bears on left leg, using crutches.  No facial droop or acute neurologic deficits.    Labs:  Results for orders placed or performed in visit on 10/14/20 (from the past 24 hour(s))   Hemoglobin   Result Value Ref Range    Hemoglobin 12.0 11.7 - 15.7 g/dL       ASSESSMENT:      ICD-10-CM    1. S/P total knee replacement, left  Z96.652 Hemoglobin      Hemoglobin is stable at 12.0.    PLAN:    Hepatitis C testing discussed with and declined by patient, who thinks that she had this done previously.    Patient is in agreement with the following plan:    Patient Instructions     Follow-up with your surgeon in 1 week, as previously scheduled.    Follow-up if new symptoms, fever, or concerns, as discussed.      Discussed risks and benefits of treatment strategies, as noted in the Assessment and Plan sections.    The patient was discharged ambulatory and in stable condition post discussion of follow up.     Disclaimer: The above dictation was composed using a combination of keyboarding and voice recognition software.  As a result, there may be errors in the dictation that have gone undetected.  Please consider this when interpreting the information found in this chart.    Fior Treviño MD

## 2020-10-15 RX ORDER — ESTRADIOL 0.03 MG/D
1 PATCH, EXTENDED RELEASE TRANSDERMAL
COMMUNITY
Start: 2020-09-24 | End: 2020-10-15

## 2020-10-15 RX ORDER — MULTIVIT-MIN/IRON/FOLIC ACID/K 18-600-40
1000 CAPSULE ORAL DAILY
COMMUNITY
Start: 2020-10-15

## 2020-10-15 RX ORDER — OXYBUTYNIN CHLORIDE 5 MG/1
1 TABLET, EXTENDED RELEASE ORAL DAILY
COMMUNITY
Start: 2020-10-01

## 2020-10-15 RX ORDER — ACETAMINOPHEN 500 MG
500-1000 TABLET ORAL EVERY 6 HOURS PRN
COMMUNITY
End: 2021-11-30

## 2020-10-15 RX ORDER — MULTIVIT-MIN/IRON/FOLIC ACID/K 18-600-40
1 CAPSULE ORAL DAILY
COMMUNITY
End: 2020-10-15

## 2020-10-15 RX ORDER — MULTIVITAMIN WITH IRON
1 TABLET ORAL AT BEDTIME
COMMUNITY

## 2020-10-15 RX ORDER — ESTRADIOL 0.03 MG/D
1 FILM, EXTENDED RELEASE TRANSDERMAL
COMMUNITY
End: 2022-06-01

## 2020-10-15 ASSESSMENT — ENCOUNTER SYMPTOMS: PALPITATIONS: 0

## 2020-10-15 NOTE — PATIENT INSTRUCTIONS
Follow-up with your surgeon in 1 week, as previously scheduled.    Follow-up if new symptoms, fever, or concerns, as discussed.

## 2020-11-10 ENCOUNTER — AMBULATORY - HEALTHEAST (OUTPATIENT)
Dept: FAMILY MEDICINE | Facility: CLINIC | Age: 65
End: 2020-11-10

## 2020-11-10 ENCOUNTER — VIRTUAL VISIT (OUTPATIENT)
Dept: FAMILY MEDICINE | Facility: OTHER | Age: 65
End: 2020-11-10

## 2020-11-10 DIAGNOSIS — Z20.822 SUSPECTED 2019 NOVEL CORONAVIRUS INFECTION: ICD-10-CM

## 2020-11-10 NOTE — PROGRESS NOTES
"Date: 11/10/2020 14:15:23  Clinician: Kwasi Beltran  Clinician NPI: [id number]  Patient: Marguerite Garcia  Patient : 1955  Patient Address: [address] [place] [address] , [place] [address]  Patient Phone: [phone]  Visit Protocol: URI  Patient Summary:  Marguerite is a 64 year old ( : 1955 ) female who initiated a [facility] Visit for COVID-19 (Coronavirus) evaluation and screening. When asked the question \"Please sign me up to receive news, health information and promotions. \", Marguerite responded \"No\".    When asked when her symptoms started, Marguerite reported that she does not have any symptoms.   She denies taking antibiotic medication in the past month and having recent facial or sinus surgery in the past 60 days.    Pertinent COVID-19 (Coronavirus) information  Marguerite does not work or volunteer as healthcare worker or a . In the past 14 days, Marguerite has not worked or volunteered at a healthcare facility or group living setting.   In the past 14 days, she also has not lived in a congregate living setting.   Marguerite has had a close contact with a laboratory-confirmed COVID-19 patient in the last 14 days. Date Marguerite was exposed to the laboratory-confirmed COVID-19 patient: 2020   Additional information about contact with COVID-19 (Coronavirus) patient as reported by the patient (free text): Spouse received positive test asymptomatic   Marguerite is living in the same household with the COVID-19 positive patient. She was in an enclosed space for greater than 15 minutes with the COVID-19 patient.   During the encounter, neither were wearing masks.   Since 2019, Marguerite has been tested for COVID-19 and has not had upper respiratory infection or influenza-like illness.      Result of COVID-19 test: Negative     Date of her COVID-19 test: 10/01/2020      Pertinent medical history  Marguerite does not get yeast infections when she takes antibiotics.   Marguerite does not need a return to work/school note.   " Weight: 133 lbs   Marguerite does not smoke or use smokeless tobacco.   Weight: 133 lbs    MEDICATIONS: metoprolol tartrate-hydrochlorothiazide oral, progesterone micronized oral, estradiol-norethindrone transdermal, oxybutynin chloride oral, Estrace vaginal, ALLERGIES: Penicillins, Sulfa (Sulfonamide Antibiotics)  Clinician Response:  Dear Marguerite,   Based on your exposure to COVID-19 (coronavirus), we would like to test you for this virus.  1. Please call [phone] to schedule your visit. Explain that you were referred by [facility] to have a COVID-19 test. Be ready to share [facility] visit ID number.  * If you need to schedule in [facility] please call [phone] or for [employer] [place] employees please call [phone].   * If you need to schedule in the [place] area please call [phone]. [place] employees call [phone].   The following will serve as your written order for this COVID Test, ordered by me, for the indication of suspected COVID [Z20.828]: The test will be ordered in [facility], our electronic health record, after you are scheduled. It will show as ordered and authorized by Cristian Nichols MD.  Order: COVID-19 (coronavirus) PCR for ASYMPTOMATIC EXPOSURE testing from [facility].   If you know you have had close contact with someone who tested positive, you should be quarantined for 14 days after this exposure. You should stay in quarantine for the14 days even if the covid test is negative, the optimal time to test after exposure is 5-7 days from the exposure  Quarantine means   What should I do?  For safety, it's very important to follow these rules. Do this for 14 days after the date you were last exposed to the virus..  Stay home and away from others. Don't go to school or anywhere else. Generally quarantine means staying home from work but there are some exceptions to this. Please contact your workplace.   No hugging, kissing or shaking hands.  Don't let anyone visit.  Cover your mouth and nose with a mask,  tissue or washcloth to avoid spreading germs.  Wash your hands and face often. Use soap and water.  What are the symptoms of COVID-19?  The most common symptoms are cough, fever and trouble breathing. Less common symptoms include headache, body aches, fatigue (feeling very tired), chills, sore throat, stuffy or runny nose, diarrhea (loose poop), loss of taste or smell, belly pain, and nausea or vomiting (feeling sick to your stomach or throwing up).  After 14 days, if you have still don't have symptoms, you likely don't have this virus.  If you develop symptoms, follow these guidelines.  If you're normally healthy: Please start another [facility] visit to report your symptoms. Go to [email or web address].  If you have a serious health problem (like cancer, heart failure, an organ transplant or kidney disease): Call your specialty clinic. Let them know that you might have COVID-19.  2. When it's time for your COVID test:  Stay at least 6 feet away from others. (If someone will drive you to your test, stay in the backseat, as far away from the  as you can.)  Cover your mouth and nose with a mask, tissue or washcloth.  Go straight to the testing site. Don't make any stops on the way there or back.  Please note  Caregivers in these groups are at risk for severe illness due to COVID-19:  o People 65 years and older  o People who live in a nursing home or long-term care facility  o People with chronic disease (lung, heart, cancer, diabetes, kidney, liver, immunologic)  o People who have a weakened immune system, including those who:  Are in cancer treatment  Take medicine that weakens the immune system, such as corticosteroids  Had a bone marrow or organ transplant  Have an immune deficiency  Have poorly controlled HIV or AIDS  Are obese (body mass index of 40 or higher)  Smoke regularly  Where can I get more information?   [facility] -- About COVID-19: [email or web address]  [facility] -- What to Do If You're Sick:  www.cdc.gov/coronavirus/2019-ncov/about/steps-when-sick.html  CDC -- Ending Home Isolation: www.cdc.gov/coronavirus/2019-ncov/hcp/disposition-in-home-patients.html  [facility] -- Caring for Someone: www.cdc.gov/coronavirus/2019-ncov/if-you-are-sick/care-for-someone.html  [facility] -- Interim Guidance for Hospital Discharge to Home: www.[facility].[place]./diseases/coronavirus/hcp/hospdischarge.pdf  [facility] clinical trials (COVID-19 research studies): [email or web address].[email or web address]-clinical-trials  Below are the COVID-19 hotlines at the [place] Department of Health ([facility]). Interpreters are available.  For health questions: Call 115-618-1111 or 1-902.160.5335 (7 a.m. to 7 p.m.)  For questions about schools and childcare: Call 976-912-4558 or 1-173.443.5362 (7 a.m. to 7 p.m.)    Diagnosis: Contact with and (suspected) exposure to other viral communicable diseases  Diagnosis ICD: Z20.828

## 2020-11-11 ENCOUNTER — AMBULATORY - HEALTHEAST (OUTPATIENT)
Dept: FAMILY MEDICINE | Facility: CLINIC | Age: 65
End: 2020-11-11

## 2020-11-11 DIAGNOSIS — Z20.822 SUSPECTED 2019 NOVEL CORONAVIRUS INFECTION: ICD-10-CM

## 2020-11-13 ENCOUNTER — COMMUNICATION - HEALTHEAST (OUTPATIENT)
Dept: SCHEDULING | Facility: CLINIC | Age: 65
End: 2020-11-13

## 2021-03-04 ENCOUNTER — HOSPITAL ENCOUNTER (OUTPATIENT)
Dept: MAMMOGRAPHY | Facility: CLINIC | Age: 66
Discharge: HOME | End: 2021-03-04
Attending: OBSTETRICS & GYNECOLOGY | Admitting: OBSTETRICS & GYNECOLOGY
Payer: MEDICARE

## 2021-03-04 DIAGNOSIS — Z12.31 OTHER SCREENING MAMMOGRAM: ICD-10-CM

## 2021-03-04 PROCEDURE — 77063 BREAST TOMOSYNTHESIS BI: CPT

## 2021-03-20 ENCOUNTER — HEALTH MAINTENANCE LETTER (OUTPATIENT)
Age: 66
End: 2021-03-20

## 2021-09-16 ENCOUNTER — TRANSFERRED RECORDS (OUTPATIENT)
Dept: HEALTH INFORMATION MANAGEMENT | Facility: CLINIC | Age: 66
End: 2021-09-16

## 2021-10-24 ENCOUNTER — HEALTH MAINTENANCE LETTER (OUTPATIENT)
Age: 66
End: 2021-10-24

## 2021-11-30 ENCOUNTER — OFFICE VISIT (OUTPATIENT)
Dept: PEDIATRICS | Facility: CLINIC | Age: 66
End: 2021-11-30
Payer: MEDICARE

## 2021-11-30 VITALS
HEART RATE: 62 BPM | OXYGEN SATURATION: 98 % | BODY MASS INDEX: 23.11 KG/M2 | SYSTOLIC BLOOD PRESSURE: 106 MMHG | HEIGHT: 65 IN | TEMPERATURE: 98.3 F | WEIGHT: 138.7 LBS | DIASTOLIC BLOOD PRESSURE: 62 MMHG

## 2021-11-30 DIAGNOSIS — Z23 NEED FOR PNEUMOCOCCAL VACCINATION: ICD-10-CM

## 2021-11-30 DIAGNOSIS — I47.20 VENTRICULAR TACHYCARDIA (H): ICD-10-CM

## 2021-11-30 DIAGNOSIS — N18.32 CHRONIC KIDNEY DISEASE, STAGE 3B (H): ICD-10-CM

## 2021-11-30 DIAGNOSIS — Z00.00 ENCOUNTER FOR PREVENTATIVE ADULT HEALTH CARE EXAMINATION: Primary | ICD-10-CM

## 2021-11-30 DIAGNOSIS — M85.851 OSTEOPENIA OF BOTH HIPS: ICD-10-CM

## 2021-11-30 DIAGNOSIS — M85.852 OSTEOPENIA OF BOTH HIPS: ICD-10-CM

## 2021-11-30 DIAGNOSIS — E03.8 SUBCLINICAL HYPOTHYROIDISM: ICD-10-CM

## 2021-11-30 LAB
ERYTHROCYTE [DISTWIDTH] IN BLOOD BY AUTOMATED COUNT: 14 % (ref 10–15)
HCT VFR BLD AUTO: 41.5 % (ref 35–47)
HGB BLD-MCNC: 13.8 G/DL (ref 11.7–15.7)
MCH RBC QN AUTO: 31.1 PG (ref 26.5–33)
MCHC RBC AUTO-ENTMCNC: 33.3 G/DL (ref 31.5–36.5)
MCV RBC AUTO: 94 FL (ref 78–100)
PLATELET # BLD AUTO: 209 10E3/UL (ref 150–450)
PTH-INTACT SERPL-MCNC: 39 PG/ML (ref 18–80)
RBC # BLD AUTO: 4.44 10E6/UL (ref 3.8–5.2)
WBC # BLD AUTO: 8.2 10E3/UL (ref 4–11)

## 2021-11-30 PROCEDURE — 85027 COMPLETE CBC AUTOMATED: CPT | Performed by: NURSE PRACTITIONER

## 2021-11-30 PROCEDURE — 83970 ASSAY OF PARATHORMONE: CPT | Performed by: NURSE PRACTITIONER

## 2021-11-30 PROCEDURE — G0402 INITIAL PREVENTIVE EXAM: HCPCS | Performed by: NURSE PRACTITIONER

## 2021-11-30 PROCEDURE — 80061 LIPID PANEL: CPT | Performed by: NURSE PRACTITIONER

## 2021-11-30 PROCEDURE — G0009 ADMIN PNEUMOCOCCAL VACCINE: HCPCS | Performed by: NURSE PRACTITIONER

## 2021-11-30 PROCEDURE — 36415 COLL VENOUS BLD VENIPUNCTURE: CPT | Performed by: NURSE PRACTITIONER

## 2021-11-30 PROCEDURE — 80048 BASIC METABOLIC PNL TOTAL CA: CPT | Performed by: NURSE PRACTITIONER

## 2021-11-30 PROCEDURE — 84443 ASSAY THYROID STIM HORMONE: CPT | Performed by: NURSE PRACTITIONER

## 2021-11-30 PROCEDURE — 82043 UR ALBUMIN QUANTITATIVE: CPT | Performed by: NURSE PRACTITIONER

## 2021-11-30 PROCEDURE — 90732 PPSV23 VACC 2 YRS+ SUBQ/IM: CPT | Performed by: NURSE PRACTITIONER

## 2021-11-30 RX ORDER — CLINDAMYCIN HCL 300 MG
CAPSULE ORAL
COMMUNITY
Start: 2021-02-09

## 2021-11-30 RX ORDER — METOPROLOL TARTRATE 50 MG
TABLET ORAL
Qty: 180 TABLET | Refills: 0 | Status: SHIPPED | OUTPATIENT
Start: 2021-11-30 | End: 2022-06-01

## 2021-11-30 RX ORDER — PROGESTERONE 100 MG/1
100 CAPSULE ORAL 2 TIMES DAILY
COMMUNITY
Start: 2021-11-18 | End: 2022-11-08

## 2021-11-30 SDOH — ECONOMIC STABILITY: INCOME INSECURITY: HOW HARD IS IT FOR YOU TO PAY FOR THE VERY BASICS LIKE FOOD, HOUSING, MEDICAL CARE, AND HEATING?: NOT HARD AT ALL

## 2021-11-30 SDOH — ECONOMIC STABILITY: TRANSPORTATION INSECURITY
IN THE PAST 12 MONTHS, HAS THE LACK OF TRANSPORTATION KEPT YOU FROM MEDICAL APPOINTMENTS OR FROM GETTING MEDICATIONS?: NO

## 2021-11-30 SDOH — ECONOMIC STABILITY: INCOME INSECURITY: IN THE LAST 12 MONTHS, WAS THERE A TIME WHEN YOU WERE NOT ABLE TO PAY THE MORTGAGE OR RENT ON TIME?: NO

## 2021-11-30 SDOH — HEALTH STABILITY: PHYSICAL HEALTH: ON AVERAGE, HOW MANY DAYS PER WEEK DO YOU ENGAGE IN MODERATE TO STRENUOUS EXERCISE (LIKE A BRISK WALK)?: 7 DAYS

## 2021-11-30 SDOH — ECONOMIC STABILITY: TRANSPORTATION INSECURITY
IN THE PAST 12 MONTHS, HAS LACK OF TRANSPORTATION KEPT YOU FROM MEETINGS, WORK, OR FROM GETTING THINGS NEEDED FOR DAILY LIVING?: NO

## 2021-11-30 SDOH — ECONOMIC STABILITY: FOOD INSECURITY: WITHIN THE PAST 12 MONTHS, THE FOOD YOU BOUGHT JUST DIDN'T LAST AND YOU DIDN'T HAVE MONEY TO GET MORE.: NEVER TRUE

## 2021-11-30 SDOH — ECONOMIC STABILITY: FOOD INSECURITY: WITHIN THE PAST 12 MONTHS, YOU WORRIED THAT YOUR FOOD WOULD RUN OUT BEFORE YOU GOT MONEY TO BUY MORE.: NEVER TRUE

## 2021-11-30 ASSESSMENT — SOCIAL DETERMINANTS OF HEALTH (SDOH)
HOW OFTEN DO YOU GET TOGETHER WITH FRIENDS OR RELATIVES?: TWICE A WEEK
HOW OFTEN DO YOU ATTEND CHURCH OR RELIGIOUS SERVICES?: MORE THAN 4 TIMES PER YEAR
DO YOU BELONG TO ANY CLUBS OR ORGANIZATIONS SUCH AS CHURCH GROUPS UNIONS, FRATERNAL OR ATHLETIC GROUPS, OR SCHOOL GROUPS?: YES

## 2021-11-30 ASSESSMENT — ENCOUNTER SYMPTOMS
DIZZINESS: 0
HEMATURIA: 0
BREAST MASS: 0
CONSTIPATION: 0
PARESTHESIAS: 0
HEARTBURN: 0
SORE THROAT: 0
HEADACHES: 0
MYALGIAS: 0
NAUSEA: 0
FREQUENCY: 0
COUGH: 0
ABDOMINAL PAIN: 0
JOINT SWELLING: 0
ARTHRALGIAS: 0
EYE PAIN: 0
FEVER: 0
NERVOUS/ANXIOUS: 0
WEAKNESS: 0
DIARRHEA: 0
DYSURIA: 0
PALPITATIONS: 0
SHORTNESS OF BREATH: 0
HEMATOCHEZIA: 0
CHILLS: 0

## 2021-11-30 ASSESSMENT — LIFESTYLE VARIABLES
HOW OFTEN DO YOU HAVE A DRINK CONTAINING ALCOHOL: MONTHLY OR LESS
HOW OFTEN DO YOU HAVE SIX OR MORE DRINKS ON ONE OCCASION: NEVER
HOW MANY STANDARD DRINKS CONTAINING ALCOHOL DO YOU HAVE ON A TYPICAL DAY: 1 OR 2

## 2021-11-30 ASSESSMENT — MIFFLIN-ST. JEOR: SCORE: 1171.05

## 2021-11-30 ASSESSMENT — ACTIVITIES OF DAILY LIVING (ADL): CURRENT_FUNCTION: NO ASSISTANCE NEEDED

## 2021-11-30 NOTE — PROGRESS NOTES
"SUBJECTIVE:   Marguerite Garcia is a 65 year old female who presents for Preventive Visit.      Patient has been advised of split billing requirements and indicates understanding: Yes   Are you in the first 12 months of your Medicare coverage?  Yes,  Visual Acuity:  Right Eye: 20/25   Left Eye: 10/10  Both Eyes: 10/8    Healthy Habits:     In general, how would you rate your overall health?  Good    Frequency of exercise:  2-3 days/week    Duration of exercise:  30-45 minutes    Do you usually eat at least 4 servings of fruit and vegetables a day, include whole grains    & fiber and avoid regularly eating high fat or \"junk\" foods?  No    Taking medications regularly:  Yes    Medication side effects:  None    Ability to successfully perform activities of daily living:  No assistance needed    Home Safety:  No safety concerns identified    Hearing Impairment:  No hearing concerns    In the past 6 months, have you been bothered by leaking of urine?  No    In general, how would you rate your overall mental or emotional health?  Excellent      PHQ-2 Total Score: 0    Additional concerns today:  No    Do you feel safe in your environment? Yes    Have you ever done Advance Care Planning? (For example, a Health Directive, POLST, or a discussion with a medical provider or your loved ones about your wishes): Yes, patient states has an Advance Care Planning document and will bring a copy to the clinic.    Follows with cardiology [place] for nonsustained VT. On metoprolol  Family history of aortic aneurysm. Mid ascending aortic diameter, stable since 2016, 3.4 cm. Last consult 2020. F/U in 5 years with ECG and ECHO    GYN - at [facility]  Had normal pap in February 2021  Estrogen patch, progesterone. Uses estrogen cream as needed (doesn't really use, learned she had a yeast infection). Oxybutynin  She states she has always been cold. GYN told her to increase progesterone and she has felt warmer  Also follows a the Menopause " Center, in [facility] (From motherhood to menopause) - they prescribe estrogen and progesterone    Osteopenia - bilateral femoral necks  Normal lumbar spine       Fall risk  Fallen 2 or more times in the past year?: No  Any fall with injury in the past year?: No    Cognitive Screening   1) Repeat 3 items (Leader, Season, Table)    2) Clock draw: NORMAL  3) 3 item recall: Recalls 2 objects   Results: NORMAL clock, 1-2 items recalled: COGNITIVE IMPAIRMENT LESS LIKELY    Mini-CogTM Copyright SHAHEED Mahmood. Licensed by the author for use in [facility]; reprinted with permission (adriane@[facility]). All rights reserved.      Do you have sleep apnea, excessive snoring or daytime drowsiness?: no    Reviewed and updated as needed this visit by clinical staff                Reviewed and updated as needed this visit by Provider               Social History     Tobacco Use     Smoking status: Never Smoker     Smokeless tobacco: Never Used   Substance Use Topics     Alcohol use: Yes     Comment: 1 glass of wine a month     If you drink alcohol do you typically have >3 drinks per day or >7 drinks per week? No    Alcohol Use 1/30/2020   Prescreen: >3 drinks/day or >7 drinks/week? No   Prescreen: >3 drinks/day or >7 drinks/week? -           Current providers sharing in care for this patient include:   Patient Care Team:  Corazon Anderson APRN CNP as PCP - General (Nurse Practitioner - Adult Health)  Corazon Anderson APRN CNP as Assigned PCP    The following health maintenance items are reviewed in Epic and correct as of today:  Health Maintenance Due   Topic Date Due     ANNUAL REVIEW OF HM ORDERS  Never done     HIV SCREENING  Never done     HEPATITIS C SCREENING  Never done     FALL RISK ASSESSMENT  Never done     Pneumococcal Vaccine: 65+ Years (1 of 1 - PPSV23) Never done     PHQ-2  01/01/2021     MEDICARE ANNUAL WELLNESS VISIT  01/30/2021     INFLUENZA VACCINE (1) 09/01/2021     COVID-19 Vaccine (3 - Booster for  Moderna series) 09/12/2021     Lab work is in process  Labs reviewed in EPIC  BP Readings from Last 3 Encounters:   11/30/21 106/62   10/14/20 110/62   10/01/20 110/73    Wt Readings from Last 3 Encounters:   11/30/21 62.9 kg (138 lb 11.2 oz)   10/14/20 62.1 kg (137 lb)   10/01/20 60.8 kg (134 lb 1.6 oz)                  Patient Active Problem List   Diagnosis     Healthcare maintenance     Herniated disk     Lateral meniscus tear     Advance care planning     Urge incontinence     Sinus arrhythmia     Post-menopausal bleeding     Chronic kidney disease, stage 3b (H)     Past Surgical History:   Procedure Laterality Date     ARTHROSCOPY KNEE RT/LT  01/01/2007    1.  Arthroscopic partial lateral meniscectomy, right knee.      COLONOSCOPY Left 10/02/2017    Procedure: COLONOSCOPY;  Colonoscopy ;  Surgeon: Flora Johns MD;  Location: [facility]     LAPAROSCOPIC CHOLECYSTECTOMY WITH CHOLANGIOGRAMS  08/07/2012    Procedure: LAPAROSCOPIC CHOLECYSTECTOMY WITH CHOLANGIOGRAMS;  LAPAROSCOPIC CHOLECYSTECTOMY WITH Intraoperative CHOLANGIOGRAMS ;  Surgeon: Elsi Bruno MD;  Location: [facility] OR     UT TOTAL KNEE ARTHROPLASTY Left 10/07/2020     ZZC NONSPECIFIC PROCEDURE      Knee Surgery     ZZC NONSPECIFIC PROCEDURE      Left breast bx     ZZC NONSPECIFIC PROCEDURE      right breast bx       Social History     Tobacco Use     Smoking status: Never Smoker     Smokeless tobacco: Never Used   Substance Use Topics     Alcohol use: Yes     Comment: 1 glass of wine a month     Family History   Problem Relation Age of Onset     Diabetes Mother      Hypertension Mother      Obesity Mother      Hypertension Father      Lipids Father      Hypertension Sister      Cerebrovascular Disease Sister      Obesity Sister      Thyroid Disease Sister      Breast Cancer No family hx of      Cancer - colorectal No family hx of          Current Outpatient Medications   Medication Sig Dispense Refill     metoprolol tartrate (LOPRESSOR) 50 MG  tablet Take 1 tablet daily 180 tablet 0     B Complex Vitamins (VITAMIN  B COMPLEX) tablet Take 1 tablet by mouth daily.       Calcium Carbonate (CALCIUM 600 PO) Take 1 tablet by mouth 2 times daily       clindamycin (CLEOCIN) 300 MG capsule TAKE 2 CAPSULES 1 HOUR PRIOR TO DENTAL CLEANING / PROCEDURE       estradiol (ESTRACE) 0.1 MG/GM vaginal cream Place vaginally At Bedtime       estradiol (VIVELLE-DOT) 0.025 MG/24HR bi-weekly patch Place 1 patch onto the skin twice a week       magnesium 250 MG tablet Take 1 tablet (250 mg) by mouth At Bedtime       oxybutynin ER (DITROPAN-XL) 5 MG 24 hr tablet Take 1 tablet by mouth daily       progesterone (PROMETRIUM) 100 MG capsule Take 100 mg by mouth 2 times daily       Vitamin D, Cholecalciferol, 25 MCG (1000 UT) TABS Take 1 tablet (1,000 Units) by mouth daily       Mammogram Screening: Mammogram Screening: Recommended mammography every 1-2 years with patient discussion and risk factor consideration  Any new diagnosis of family breast, ovarian, or bowel cancer? No    FHS-7: No flowsheet data found.  click delete button to remove this line now  Mammogram Screening: Recommended mammography every 1-2 years with patient discussion and risk factor consideration  Pertinent mammograms are reviewed under the imaging tab.    Review of Systems   Constitutional: Negative for chills and fever.   HENT: Negative for congestion, ear pain, hearing loss and sore throat.    Eyes: Negative for pain and visual disturbance.   Respiratory: Negative for cough and shortness of breath.    Cardiovascular: Negative for chest pain, palpitations and peripheral edema.   Gastrointestinal: Negative for abdominal pain, constipation, diarrhea, heartburn, hematochezia and nausea.   Breasts:  Negative for tenderness, breast mass and discharge.   Genitourinary: Negative for dysuria, frequency, genital sores, hematuria, pelvic pain, urgency, vaginal bleeding and vaginal discharge.   Musculoskeletal: Negative  "for arthralgias, joint swelling and myalgias.   Skin: Negative for rash.   Neurological: Negative for dizziness, weakness, headaches and paresthesias.   Psychiatric/Behavioral: Negative for mood changes. The patient is not nervous/anxious.          OBJECTIVE:   /62 (BP Location: Right arm, Patient Position: Chair, Cuff Size: Adult Regular)   Pulse 62   Temp 98.3  F (36.8  C) (Tympanic)   Ht 1.645 m (5' 4.75\")   Wt 62.9 kg (138 lb 11.2 oz)   SpO2 98%   BMI 23.26 kg/m   Estimated body mass index is 23.15 kg/m  as calculated from the following:    Height as of 10/14/20: 1.638 m (5' 4.5\").    Weight as of 10/14/20: 62.1 kg (137 lb).  Physical Exam  GENERAL: healthy, alert and no distress  EYES: Eyes grossly normal to inspection, PERRL and conjunctivae and sclerae normal  HENT: ear canals and TM's normal, nose and mouth without ulcers or lesions  NECK: no adenopathy, no asymmetry, masses, or scars and thyroid normal to palpation  RESP: lungs clear to auscultation - no rales, rhonchi or wheezes  CV: regular rate and rhythm, normal S1 S2, no S3 or S4, no murmur, click or rub, no peripheral edema and peripheral pulses strong  ABDOMEN: soft, nontender, no hepatosplenomegaly, no masses and bowel sounds normal  MS: no gross musculoskeletal defects noted, no edema  SKIN: no suspicious lesions or rashes  NEURO: Normal strength and tone, mentation intact and speech normal  PSYCH: mentation appears normal, affect normal/bright    Diagnostic Test Results:  Labs reviewed in Epic    ASSESSMENT / PLAN:       ICD-10-CM    1. Encounter for preventative adult health care examination  Z00.00 Lipid panel reflex to direct LDL Fasting     Lipid panel reflex to direct LDL Fasting   2. Ventricular tachycardia (H)  I47.2 metoprolol tartrate (LOPRESSOR) 50 MG tablet    Follows with cardiology, [place]. ECHO and ECG in in 2025   3. Need for pneumococcal vaccination  Z23    4. Chronic kidney disease, stage 3b (H)  N18.32 Basic metabolic " "panel  (Ca, Cl, CO2, Creat, Gluc, K, Na, BUN)     Albumin Random Urine Quantitative with Creat Ratio     CBC with platelets     Parathyroid Hormone Intact     Basic metabolic panel  (Ca, Cl, CO2, Creat, Gluc, K, Na, BUN)     Albumin Random Urine Quantitative with Creat Ratio     CBC with platelets     Parathyroid Hormone Intact   5. Subclinical hypothyroidism  E03.8 TSH with free T4 reflex     TSH with free T4 reflex   6. Osteopenia of both hips  M85.851     M85.852     bilateral femoral neck, 2021, repeat 2023     Chronic conditions stable. Follows with specialty  Discussed risks and benefits of HRT    Patient has been advised of split billing requirements and indicates understanding: Yes  COUNSELING:  Reviewed preventive health counseling, as reflected in patient instructions       Regular exercise       Healthy diet/nutrition    Estimated body mass index is 23.15 kg/m  as calculated from the following:    Height as of 10/14/20: 1.638 m (5' 4.5\").    Weight as of 10/14/20: 62.1 kg (137 lb).        She reports that she has never smoked. She has never used smokeless tobacco.      Appropriate preventive services were discussed with this patient, including applicable screening as appropriate for cardiovascular disease, diabetes, osteopenia/osteoporosis, and glaucoma.  As appropriate for age/gender, discussed screening for colorectal cancer, prostate cancer, breast cancer, and cervical cancer. Checklist reviewing preventive services available has been given to the patient.    Reviewed patients plan of care and provided an AVS. The Basic Care Plan (routine screening as documented in Health Maintenance) for Marguerite meets the Care Plan requirement. This Care Plan has been established and reviewed with the Patient.    Counseling Resources:  ATP IV Guidelines  Pooled Cohorts Equation Calculator  Breast Cancer Risk Calculator  Breast Cancer: Medication to Reduce Risk  FRAX Risk Assessment  ICSI Preventive Guidelines  Dietary " Guidelines for Americans, 2010  USDA's MyPlate  ASA Prophylaxis  Lung CA Screening    GERHARD Null CNP  [facility]    Identified Health Risks:

## 2021-12-01 LAB
ANION GAP SERPL CALCULATED.3IONS-SCNC: 3 MMOL/L (ref 3–14)
BUN SERPL-MCNC: 20 MG/DL (ref 7–30)
CALCIUM SERPL-MCNC: 9 MG/DL (ref 8.5–10.1)
CHLORIDE BLD-SCNC: 106 MMOL/L (ref 94–109)
CHOLEST SERPL-MCNC: 205 MG/DL
CO2 SERPL-SCNC: 30 MMOL/L (ref 20–32)
CREAT SERPL-MCNC: 0.92 MG/DL (ref 0.52–1.04)
CREAT UR-MCNC: 30 MG/DL
FASTING STATUS PATIENT QL REPORTED: NO
GFR SERPL CREATININE-BSD FRML MDRD: 66 ML/MIN/1.73M2
GLUCOSE BLD-MCNC: 102 MG/DL (ref 70–99)
HDLC SERPL-MCNC: 69 MG/DL
LDLC SERPL CALC-MCNC: 112 MG/DL
MICROALBUMIN UR-MCNC: <5 MG/L
MICROALBUMIN/CREAT UR: NORMAL MG/G{CREAT}
NONHDLC SERPL-MCNC: 136 MG/DL
POTASSIUM BLD-SCNC: 4.1 MMOL/L (ref 3.4–5.3)
SODIUM SERPL-SCNC: 139 MMOL/L (ref 133–144)
TRIGL SERPL-MCNC: 118 MG/DL
TSH SERPL DL<=0.005 MIU/L-ACNC: 2.08 MU/L (ref 0.4–4)

## 2021-12-02 DIAGNOSIS — I47.20 VENTRICULAR TACHYCARDIA (H): ICD-10-CM

## 2021-12-02 RX ORDER — METOPROLOL TARTRATE 50 MG
TABLET ORAL
Qty: 180 TABLET | Refills: 3 | Status: SHIPPED | OUTPATIENT
Start: 2021-12-02 | End: 2022-11-08

## 2022-04-27 ENCOUNTER — TRANSFERRED RECORDS (OUTPATIENT)
Dept: HEALTH INFORMATION MANAGEMENT | Facility: CLINIC | Age: 67
End: 2022-04-27
Payer: MEDICARE

## 2022-05-16 ENCOUNTER — APPOINTMENT (OUTPATIENT)
Dept: CT IMAGING | Facility: CLINIC | Age: 67
End: 2022-05-16
Attending: EMERGENCY MEDICINE
Payer: MEDICARE

## 2022-05-16 ENCOUNTER — NURSE TRIAGE (OUTPATIENT)
Dept: PEDIATRICS | Facility: CLINIC | Age: 67
End: 2022-05-16
Payer: MEDICARE

## 2022-05-16 ENCOUNTER — HOSPITAL ENCOUNTER (EMERGENCY)
Facility: CLINIC | Age: 67
Discharge: HOME | End: 2022-05-16
Attending: EMERGENCY MEDICINE | Admitting: EMERGENCY MEDICINE
Payer: MEDICARE

## 2022-05-16 VITALS
WEIGHT: 138.7 LBS | TEMPERATURE: 98.2 F | SYSTOLIC BLOOD PRESSURE: 151 MMHG | HEART RATE: 61 BPM | OXYGEN SATURATION: 100 % | BODY MASS INDEX: 23.26 KG/M2 | RESPIRATION RATE: 16 BRPM | DIASTOLIC BLOOD PRESSURE: 85 MMHG

## 2022-05-16 DIAGNOSIS — R07.9 CHEST PAIN, UNSPECIFIED TYPE: ICD-10-CM

## 2022-05-16 LAB
ANION GAP SERPL CALCULATED.3IONS-SCNC: 6 MMOL/L (ref 3–14)
BASOPHILS # BLD AUTO: 0 10E3/UL (ref 0–0.2)
BASOPHILS NFR BLD AUTO: 0 %
BUN SERPL-MCNC: 28 MG/DL (ref 7–30)
CALCIUM SERPL-MCNC: 9.1 MG/DL (ref 8.5–10.1)
CHLORIDE BLD-SCNC: 103 MMOL/L (ref 94–109)
CO2 SERPL-SCNC: 27 MMOL/L (ref 20–32)
CREAT SERPL-MCNC: 0.82 MG/DL (ref 0.52–1.04)
D DIMER PPP FEU-MCNC: 1.08 UG/ML FEU (ref 0–0.5)
EOSINOPHIL # BLD AUTO: 0.1 10E3/UL (ref 0–0.7)
EOSINOPHIL NFR BLD AUTO: 1 %
ERYTHROCYTE [DISTWIDTH] IN BLOOD BY AUTOMATED COUNT: 13.1 % (ref 10–15)
GFR SERPL CREATININE-BSD FRML MDRD: 78 ML/MIN/1.73M2
GLUCOSE BLD-MCNC: 103 MG/DL (ref 70–99)
HCT VFR BLD AUTO: 43.9 % (ref 35–47)
HGB BLD-MCNC: 14.4 G/DL (ref 11.7–15.7)
HOLD SPECIMEN: NORMAL
IMM GRANULOCYTES # BLD: 0.5 10E3/UL
IMM GRANULOCYTES NFR BLD: 4 %
LYMPHOCYTES # BLD AUTO: 2.3 10E3/UL (ref 0.8–5.3)
LYMPHOCYTES NFR BLD AUTO: 22 %
MCH RBC QN AUTO: 30 PG (ref 26.5–33)
MCHC RBC AUTO-ENTMCNC: 32.8 G/DL (ref 31.5–36.5)
MCV RBC AUTO: 92 FL (ref 78–100)
MONOCYTES # BLD AUTO: 0.9 10E3/UL (ref 0–1.3)
MONOCYTES NFR BLD AUTO: 9 %
NEUTROPHILS # BLD AUTO: 6.6 10E3/UL (ref 1.6–8.3)
NEUTROPHILS NFR BLD AUTO: 64 %
NRBC # BLD AUTO: 0 10E3/UL
NRBC BLD AUTO-RTO: 0 /100
PLATELET # BLD AUTO: 250 10E3/UL (ref 150–450)
POTASSIUM BLD-SCNC: 4.4 MMOL/L (ref 3.4–5.3)
RBC # BLD AUTO: 4.8 10E6/UL (ref 3.8–5.2)
SODIUM SERPL-SCNC: 136 MMOL/L (ref 133–144)
TROPONIN I SERPL HS-MCNC: 4 NG/L
WBC # BLD AUTO: 10.4 10E3/UL (ref 4–11)

## 2022-05-16 PROCEDURE — 85379 FIBRIN DEGRADATION QUANT: CPT | Performed by: EMERGENCY MEDICINE

## 2022-05-16 PROCEDURE — 71275 CT ANGIOGRAPHY CHEST: CPT | Mod: ME

## 2022-05-16 PROCEDURE — 250N000011 HC RX IP 250 OP 636: Performed by: EMERGENCY MEDICINE

## 2022-05-16 PROCEDURE — 80048 BASIC METABOLIC PNL TOTAL CA: CPT | Performed by: PHYSICIAN ASSISTANT

## 2022-05-16 PROCEDURE — 250N000009 HC RX 250: Performed by: EMERGENCY MEDICINE

## 2022-05-16 PROCEDURE — 84484 ASSAY OF TROPONIN QUANT: CPT | Performed by: EMERGENCY MEDICINE

## 2022-05-16 PROCEDURE — 85025 COMPLETE CBC W/AUTO DIFF WBC: CPT | Performed by: PHYSICIAN ASSISTANT

## 2022-05-16 PROCEDURE — 93005 ELECTROCARDIOGRAM TRACING: CPT

## 2022-05-16 PROCEDURE — G1004 CDSM NDSC: HCPCS

## 2022-05-16 PROCEDURE — 36415 COLL VENOUS BLD VENIPUNCTURE: CPT | Performed by: PHYSICIAN ASSISTANT

## 2022-05-16 PROCEDURE — 99285 EMERGENCY DEPT VISIT HI MDM: CPT | Mod: 25

## 2022-05-16 RX ORDER — IOPAMIDOL 755 MG/ML
500 INJECTION, SOLUTION INTRAVASCULAR ONCE
Status: COMPLETED | OUTPATIENT
Start: 2022-05-16 | End: 2022-05-16

## 2022-05-16 RX ADMIN — IOPAMIDOL 53 ML: 755 INJECTION, SOLUTION INTRAVENOUS at 21:20

## 2022-05-16 RX ADMIN — SODIUM CHLORIDE 74 ML: 9 INJECTION, SOLUTION INTRAVENOUS at 21:20

## 2022-05-16 ASSESSMENT — ENCOUNTER SYMPTOMS
PALPITATIONS: 0
VOMITING: 0
SORE THROAT: 0
SHORTNESS OF BREATH: 0
NAUSEA: 0
COUGH: 0
CHILLS: 0
FEVER: 0

## 2022-05-16 NOTE — ED TRIAGE NOTES
Patient presents to the ED reporting 2 episodes of chest heaviness. States had 1 episode yesterday while watching TV that resolved after a few minutes. Pain awoke patient from sleep at 0200 this morning and again lasted for a few minutes and then resolved. Patient also reports mild jaw pain associated with the chest pain.

## 2022-05-16 NOTE — TELEPHONE ENCOUNTER
"2nd level triage- huddled with Kya Hansen MD who recommends pt go to ED now    S-(situation): chest paain    B-(background): pt has hx of arrhythmia and takes beta blocker for it. Yesterday 5/15/22 pt had episode of chest pain lasting 3 minutes and 2nd episode of chest pain lasting 3 minutes occurring at 2am 5/16/22.    A-(assessment): chest pain rated at 2 or 3 out of 10 on pain scale with simultaneous jaw sensation (pt states \"didn't radiate but occurred same time\").     Denies: cough, fever, SOB, dizziness, recent illness, nausea, vomiting    R-(recommendations): Go to ED now.    Patient was given an opportunity to ask questions, verbalized understanding of plan, and is agreeable.    Bridget Chen RN        Reason for Disposition    All other patients with chest pain (Exception: fleeting chest pain lasting a few seconds)    Additional Information    Negative: Severe difficulty breathing (e.g., struggling for each breath, speaks in single words)    Negative: Passed out (i.e., fainted, collapsed and was not responding)    Negative: Difficult to awaken or acting confused (e.g., disoriented, slurred speech)    Negative: Shock suspected (e.g., cold/pale/clammy skin, too weak to stand, low BP, rapid pulse)    Negative: Chest pain lasting longer than 5 minutes and ANY of the following:* Over 45 years old* Over 30 years old and at least one cardiac risk factor (e.g., diabetes, high blood pressure, high cholesterol, smoker, or strong family history of heart disease)* History of heart disease (i.e., angina, heart attack, heart failure, bypass surgery, takes nitroglycerin)* Pain is crushing, pressure-like, or heavy    Negative: Heart beating < 50 beats per minute OR > 140 beats per minute    Negative: Visible sweat on face or sweat dripping down face    Negative: Sounds like a life-threatening emergency to the triager    Negative: Followed an injury to chest    Negative: SEVERE chest pain    Negative: Pain also " "in shoulder(s) or arm(s) or jaw    Negative: Difficulty breathing    Negative: Cocaine use within last 3 days    Negative: Major surgery in the past month    Negative: Hip or leg fracture (broken bone) in past month (or had cast on leg or ankle in past month)    Negative: Illness requiring prolonged bedrest in past month (e.g., immobilization, long hospital stay)    Negative: Long-distance travel in past month (e.g., car, bus, train, plane; with trip lasting 6 or more hours)    Negative: History of prior 'blood clot' in leg or lungs (i.e., deep vein thrombosis, pulmonary embolism)    Negative: History of inherited increased risk of blood clots (e.g., Factor 5 Leiden, Anti-thrombin 3, Protein C or Protein S deficiency, Prothrombin mutation)    Negative: Heart beating irregularly or very rapidly    Negative: Chest pain lasting longer than 5 minutes and occurred in last 3 days (72 hours) (Exception: feels exactly the same as previously diagnosed heartburn and has accompanying sour taste in mouth)    Negative: Chest pain or 'angina' comes and goes and is happening more often (increasing in frequency) or getting worse (increasing in severity) (Exception: chest pains that last only a few seconds)    Negative: Dizziness or lightheadedness    Negative: Coughing up blood    Negative: Patient sounds very sick or weak to the triager    Negative: Cancer treatment in the past two months (or has cancer now)    Negative: Patient says chest pain feels exactly the same as previously diagnosed 'heartburn'  and  describes burning in chest and accompanying sour taste in mouth    Negative: Fever > 100.4 F (38.0 C)    Negative: Chest pain(s) lasting a few seconds persists > 3 days    Negative: Rash in same area as pain (may be described as 'small blisters')    Answer Assessment - Initial Assessment Questions  1. LOCATION: \"Where does it hurt?\"        Right in center of chest (right on sternum)  2. RADIATION: \"Does the pain go anywhere " "else?\" (e.g., into neck, jaw, arms, back)      Jaw pain at the same time (didn't feel like it was radiating but occurred same time)  3. ONSET: \"When did the chest pain begin?\" (Minutes, hours or days)       Yesterday 5/15/22 morning around 9am, last night around 2am 5/16/22  4. PATTERN \"Does the pain come and go, or has it been constant since it started?\"  \"Does it get worse with exertion?\"       Comes and goes (only happened twice)  5. DURATION: \"How long does it last\" (e.g., seconds, minutes, hours)      Just a few minutes  6. SEVERITY: \"How bad is the pain?\"  (e.g., Scale 1-10; mild, moderate, or severe)     - MILD (1-3): doesn't interfere with normal activities      - MODERATE (4-7): interferes with normal activities or awakens from sleep     - SEVERE (8-10): excruciating pain, unable to do any normal activities        2 or 3 on pain scale but second episode did wake her up from sleep  Mild-moderate  7. CARDIAC RISK FACTORS: \"Do you have any history of heart problems or risk factors for heart disease?\" (e.g., angina, prior heart attack; diabetes, high blood pressure, high cholesterol, smoker, or strong family history of heart disease)      Takes beta blocker for arrythmia    8. PULMONARY RISK FACTORS: \"Do you have any history of lung disease?\"  (e.g., blood clots in lung, asthma, emphysema, birth control pills)      no  9. CAUSE: \"What do you think is causing the chest pain?\"      unsure  10. OTHER SYMPTOMS: \"Do you have any other symptoms?\" (e.g., dizziness, nausea, vomiting, sweating, fever, difficulty breathing, cough)        Denies: dizziness, nausea, vomiting, fever, sweating, difficulty breathing, cough, recent illness    11. PREGNANCY: \"Is there any chance you are pregnant?\" \"When was your last menstrual period?\"        no    Protocols used: CHEST PAIN-A-OH      "

## 2022-05-17 LAB
ATRIAL RATE - MUSE: 69 BPM
DIASTOLIC BLOOD PRESSURE - MUSE: NORMAL MMHG
INTERPRETATION ECG - MUSE: NORMAL
P AXIS - MUSE: 57 DEGREES
PR INTERVAL - MUSE: 134 MS
QRS DURATION - MUSE: 88 MS
QT - MUSE: 380 MS
QTC - MUSE: 407 MS
R AXIS - MUSE: 33 DEGREES
SYSTOLIC BLOOD PRESSURE - MUSE: NORMAL MMHG
T AXIS - MUSE: 54 DEGREES
VENTRICULAR RATE- MUSE: 69 BPM
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
## 2022-06-01 ENCOUNTER — OFFICE VISIT (OUTPATIENT)
Dept: PEDIATRICS | Facility: CLINIC | Age: 67
End: 2022-06-01
Payer: MEDICARE

## 2022-06-01 VITALS
WEIGHT: 136.8 LBS | DIASTOLIC BLOOD PRESSURE: 70 MMHG | SYSTOLIC BLOOD PRESSURE: 124 MMHG | HEART RATE: 61 BPM | TEMPERATURE: 97.6 F | OXYGEN SATURATION: 100 % | RESPIRATION RATE: 14 BRPM | BODY MASS INDEX: 22.94 KG/M2

## 2022-06-01 DIAGNOSIS — R07.9 CHEST PAIN, UNSPECIFIED TYPE: Primary | ICD-10-CM

## 2022-06-01 DIAGNOSIS — I47.20 VENTRICULAR TACHYCARDIA (H): ICD-10-CM

## 2022-06-01 DIAGNOSIS — I49.8 SINUS ARRHYTHMIA: ICD-10-CM

## 2022-06-01 PROBLEM — N18.32 CHRONIC KIDNEY DISEASE, STAGE 3B (H): Status: RESOLVED | Noted: 2021-11-30 | Resolved: 2022-06-01

## 2022-06-01 PROCEDURE — 99214 OFFICE O/P EST MOD 30 MIN: CPT | Performed by: NURSE PRACTITIONER

## 2022-06-01 PROCEDURE — 93000 ELECTROCARDIOGRAM COMPLETE: CPT | Performed by: NURSE PRACTITIONER

## 2022-06-01 ASSESSMENT — PAIN SCALES - GENERAL: PAINLEVEL: NO PAIN (0)

## 2022-06-01 NOTE — PATIENT INSTRUCTIONS
It was nice seeing you today.    Please let me know if you have any questions regarding today's visit!    Take care,    ADALBERTO Patel DNP  Family Medicine

## 2022-06-01 NOTE — PROGRESS NOTES
Assessment & Plan     Chest pain, unspecified type  One episode of chest discomfort that promoted ER visit 5/16/2022.    Patient followed by EP at [facility].  Needs repeat EKG and Echo in 2025.  Due to family history of aneurysm and arrhythmia, will order stress echo.  Follow-up as needed.  - EKG 12-lead complete w/read - Clinics  - Echo Stress Echocardiogram; Future    Sinus arrhythmia  - Echo Stress Echocardiogram; Future    Ventricular tachycardia (H)  - Echo Stress Echocardiogram; Future      Return if symptoms worsen or fail to improve.    Cielo Patel NP  [facility] JOYCE Everett is a 66 year old who presents for the following health issues:    History of Present Illness       Reason for visit:  Chest pain  Symptom onset:  1-2 weeks ago  Symptoms include:  Dull ache in center of chest experienced twice  Symptom intensity:  Mild  Symptom progression:  Improving  Had these symptoms before:  No    She eats 2-3 servings of fruits and vegetables daily.She consumes 0 sweetened beverage(s) daily.She exercises with enough effort to increase her heart rate 30 to 60 minutes per day.  She exercises with enough effort to increase her heart rate 5 days per week.   She is taking medications regularly.    Chest pain:  -Was seen in ER 5/16/2022.  DVT negative, EKG NSR, and troponin WNL. Discharged and was told it was just chest pain.  -No new episode since 5/16/2022  -Sinus arrhythmia--tachycardia ventricular.  Followed by EP at [facility].  Suggested EKG and echo repeat in 2025.  -Stress test at [facility] 2015 was normal  -No SOB with exercise.  Was able to hike 12 miles yesterday.    Review of Systems   Constitutional, HEENT, cardiovascular, pulmonary, gi and gu systems are negative, except as otherwise noted.      Objective    /70 (BP Location: Right arm, Patient Position: Sitting, Cuff Size: Adult Regular)   Pulse 61   Temp 97.6  F (36.4  C) (Tympanic)   Resp 14   Wt 62.1 kg (136 lb 12.8  oz)   SpO2 100%   BMI 22.94 kg/m    Body mass index is 22.94 kg/m .       Physical Exam   GENERAL: healthy, alert and no distress  RESP: lungs clear to auscultation - no rales, rhonchi or wheezes  CV: regular rate and rhythm, normal S1 S2, no S3 or S4, no murmur, click or rub, no peripheral edema and peripheral pulses strong  PSYCH: mentation appears normal, affect normal/bright

## 2022-07-20 ENCOUNTER — HOSPITAL ENCOUNTER (OUTPATIENT)
Dept: CARDIOLOGY | Facility: CLINIC | Age: 67
Discharge: HOME | End: 2022-07-20
Attending: NURSE PRACTITIONER | Admitting: NURSE PRACTITIONER
Payer: MEDICARE

## 2022-07-20 DIAGNOSIS — R07.9 CHEST PAIN, UNSPECIFIED TYPE: ICD-10-CM

## 2022-07-20 DIAGNOSIS — I47.20 VENTRICULAR TACHYCARDIA (H): ICD-10-CM

## 2022-07-20 DIAGNOSIS — I49.8 SINUS ARRHYTHMIA: ICD-10-CM

## 2022-07-20 PROCEDURE — 93016 CV STRESS TEST SUPVJ ONLY: CPT | Performed by: INTERNAL MEDICINE

## 2022-07-20 PROCEDURE — 93350 STRESS TTE ONLY: CPT | Mod: 26 | Performed by: INTERNAL MEDICINE

## 2022-07-20 PROCEDURE — 255N000002 HC RX 255 OP 636: Performed by: NURSE PRACTITIONER

## 2022-07-20 PROCEDURE — 93321 DOPPLER ECHO F-UP/LMTD STD: CPT | Mod: 26 | Performed by: INTERNAL MEDICINE

## 2022-07-20 PROCEDURE — 93325 DOPPLER ECHO COLOR FLOW MAPG: CPT | Mod: 26 | Performed by: INTERNAL MEDICINE

## 2022-07-20 PROCEDURE — 93018 CV STRESS TEST I&R ONLY: CPT | Performed by: INTERNAL MEDICINE

## 2022-07-20 PROCEDURE — 93325 DOPPLER ECHO COLOR FLOW MAPG: CPT | Mod: TC

## 2022-07-20 RX ADMIN — HUMAN ALBUMIN MICROSPHERES AND PERFLUTREN 3 ML: 10; .22 INJECTION, SOLUTION INTRAVENOUS at 09:51

## 2022-10-16 ENCOUNTER — HEALTH MAINTENANCE LETTER (OUTPATIENT)
Age: 67
End: 2022-10-16

## 2022-11-07 ASSESSMENT — ENCOUNTER SYMPTOMS
SORE THROAT: 0
HEARTBURN: 1
CONSTIPATION: 0
HEADACHES: 0
NAUSEA: 0
BREAST MASS: 0
NERVOUS/ANXIOUS: 0
FREQUENCY: 0
DIZZINESS: 0
FEVER: 0
HEMATURIA: 0
JOINT SWELLING: 0
HEMATOCHEZIA: 0
COUGH: 0
WEAKNESS: 0
PALPITATIONS: 0
DIARRHEA: 0
DYSURIA: 0
ABDOMINAL PAIN: 0
SHORTNESS OF BREATH: 0
CHILLS: 0
EYE PAIN: 0
MYALGIAS: 0
ARTHRALGIAS: 0
PARESTHESIAS: 0

## 2022-11-07 ASSESSMENT — ACTIVITIES OF DAILY LIVING (ADL): CURRENT_FUNCTION: NO ASSISTANCE NEEDED

## 2022-11-08 ENCOUNTER — OFFICE VISIT (OUTPATIENT)
Dept: PEDIATRICS | Facility: CLINIC | Age: 67
End: 2022-11-08
Payer: MEDICARE

## 2022-11-08 VITALS
DIASTOLIC BLOOD PRESSURE: 60 MMHG | SYSTOLIC BLOOD PRESSURE: 110 MMHG | OXYGEN SATURATION: 98 % | RESPIRATION RATE: 16 BRPM | WEIGHT: 136.3 LBS | HEART RATE: 62 BPM | TEMPERATURE: 98.2 F | HEIGHT: 65 IN | BODY MASS INDEX: 22.71 KG/M2

## 2022-11-08 DIAGNOSIS — Z00.00 ROUTINE GENERAL MEDICAL EXAMINATION AT A HEALTH CARE FACILITY: Primary | ICD-10-CM

## 2022-11-08 DIAGNOSIS — Z78.0 ASYMPTOMATIC MENOPAUSAL STATE: ICD-10-CM

## 2022-11-08 DIAGNOSIS — I47.20 VENTRICULAR TACHYCARDIA (H): ICD-10-CM

## 2022-11-08 LAB
ANION GAP SERPL CALCULATED.3IONS-SCNC: 7 MMOL/L (ref 3–14)
BUN SERPL-MCNC: 18 MG/DL (ref 7–30)
CALCIUM SERPL-MCNC: 10 MG/DL (ref 8.5–10.1)
CHLORIDE BLD-SCNC: 106 MMOL/L (ref 94–109)
CHOLEST SERPL-MCNC: 224 MG/DL
CO2 SERPL-SCNC: 28 MMOL/L (ref 20–32)
CREAT SERPL-MCNC: 0.74 MG/DL (ref 0.52–1.04)
FASTING STATUS PATIENT QL REPORTED: YES
FERRITIN SERPL-MCNC: 203 NG/ML (ref 8–252)
GFR SERPL CREATININE-BSD FRML MDRD: 89 ML/MIN/1.73M2
GLUCOSE BLD-MCNC: 91 MG/DL (ref 70–99)
HDLC SERPL-MCNC: 68 MG/DL
LDLC SERPL CALC-MCNC: 136 MG/DL
NONHDLC SERPL-MCNC: 156 MG/DL
POTASSIUM BLD-SCNC: 4.2 MMOL/L (ref 3.4–5.3)
SODIUM SERPL-SCNC: 141 MMOL/L (ref 133–144)
TRIGL SERPL-MCNC: 102 MG/DL

## 2022-11-08 PROCEDURE — G0438 PPPS, INITIAL VISIT: HCPCS | Performed by: INTERNAL MEDICINE

## 2022-11-08 PROCEDURE — 82728 ASSAY OF FERRITIN: CPT | Performed by: INTERNAL MEDICINE

## 2022-11-08 PROCEDURE — 80061 LIPID PANEL: CPT | Performed by: INTERNAL MEDICINE

## 2022-11-08 PROCEDURE — 36415 COLL VENOUS BLD VENIPUNCTURE: CPT | Performed by: INTERNAL MEDICINE

## 2022-11-08 PROCEDURE — 80048 BASIC METABOLIC PNL TOTAL CA: CPT | Performed by: INTERNAL MEDICINE

## 2022-11-08 RX ORDER — METOPROLOL TARTRATE 50 MG
TABLET ORAL
Qty: 180 TABLET | Refills: 3 | Status: SHIPPED | OUTPATIENT
Start: 2022-11-08 | End: 2023-11-30

## 2022-11-08 ASSESSMENT — ENCOUNTER SYMPTOMS
CONSTIPATION: 0
DYSURIA: 0
CHILLS: 0
ARTHRALGIAS: 0
EYE PAIN: 0
FEVER: 0
ABDOMINAL PAIN: 0
BREAST MASS: 0
JOINT SWELLING: 0
SORE THROAT: 0
HEMATURIA: 0
HEADACHES: 0
PARESTHESIAS: 0
COUGH: 0
FREQUENCY: 0
MYALGIAS: 0
PALPITATIONS: 0
HEARTBURN: 1
NAUSEA: 0
SHORTNESS OF BREATH: 0
DIARRHEA: 0
DIZZINESS: 0
HEMATOCHEZIA: 0
NERVOUS/ANXIOUS: 0
WEAKNESS: 0

## 2022-11-08 ASSESSMENT — ACTIVITIES OF DAILY LIVING (ADL): CURRENT_FUNCTION: NO ASSISTANCE NEEDED

## 2022-11-08 ASSESSMENT — PAIN SCALES - GENERAL: PAINLEVEL: NO PAIN (0)

## 2022-11-08 NOTE — PROGRESS NOTES
"SUBJECTIVE:   Marguerite is a 66 year old who presents for Preventive Visit.    Patient has been advised of split billing requirements and indicates understanding: Yes  Are you in the first 12 months of your Medicare coverage?  No    Healthy Habits:     In general, how would you rate your overall health?  Good    Frequency of exercise:  2-3 days/week    Duration of exercise:  15-30 minutes    Do you usually eat at least 4 servings of fruit and vegetables a day, include whole grains    & fiber and avoid regularly eating high fat or \"junk\" foods?  Yes    Taking medications regularly:  Yes    Medication side effects:  Not applicable    Ability to successfully perform activities of daily living:  No assistance needed    Home Safety:  No safety concerns identified    Hearing Impairment:  No hearing concerns    In the past 6 months, have you been bothered by leaking of urine?  No    In general, how would you rate your overall mental or emotional health?  Good      PHQ-2 Total Score: 0    Additional concerns today:  No    Marguerite is here for a preventive health visit.  Overall, she is doing well with the following concerns:      1. Had covid in end of September, will wait on booster.     2.  Had an interesting episode of heartburn, worried about heart disease. Precipitated by [employer] chicken fingers. Still felt not good the next day. Did a workup for her heart due to a funny sensation. She has a strong family history of heart disease (ascending aortic aneurysm in her mother and grandmother).  Has been doing a 5 year workup at [facility] (due to arrhytymia,well controlled with beta blocker).  Her dad had atherosclerotic disease.     The 10-year ASCVD risk score (Vernon ULLOA, et al., 2019) is: 4.3%    Values used to calculate the score:      Age: 66 years      Sex: Female      Is Non- : No      Diabetic: No      Tobacco smoker: No      Systolic Blood Pressure: 110 mmHg      Is BP treated: No      HDL Cholesterol: 69 " mg/dL      Total Cholesterol: 205 mg/dL     3. Had been on hormone replacement therapy (prescribed for bleeding). Stopped it this summer. Had a polyp. Goes to [facility] ob gyn.     4. Had bone scan in 2/2021 - mild osteopenia. Does take calcium and vitamin d. Also magnesium at night time for her legs.     5. Had knee replacement a year ago, still has ongoing pain. Does orthology PT and it has been helpful.     Do you feel safe in your environment? Yes    Have you ever done Advance Care Planning? (For example, a Health Directive, POLST, or a discussion with a medical provider or your loved ones about your wishes): No, advance care planning information given to patient to review.  Patient declined advance care planning discussion at this time.       Fall risk  Fallen 2 or more times in the past year?: No  Any fall with injury in the past year?: No    Cognitive Screening   1) Repeat 3 items (Leader, Season, Table)    2) Clock draw: NORMAL  3) 3 item recall: Recalls 3 objects  Results: 3 items recalled: COGNITIVE IMPAIRMENT LESS LIKELY    Mini-CogTM Copyright S Ela. Licensed by the author for use in [facility]; reprinted with permission (adriane@[facility]). All rights reserved.      Do you have sleep apnea, excessive snoring or daytime drowsiness?: no    Reviewed and updated as needed this visit by clinical staff   Tobacco  Allergies  Meds  Problems  Med Hx  Surg Hx  Fam Hx  Soc   Hx        Reviewed and updated as needed this visit by Provider   Tobacco  Allergies  Meds  Problems  Med Hx  Surg Hx  Fam Hx         Social History     Tobacco Use     Smoking status: Never     Smokeless tobacco: Never   Substance Use Topics     Alcohol use: Yes     Comment: infrequent glass of wine     If you drink alcohol do you typically have >3 drinks per day or >7 drinks per week? No    Alcohol Use 11/8/2022   Prescreen: >3 drinks/day or >7 drinks/week? -   Prescreen: >3 drinks/day or >7 drinks/week? No                Current providers sharing in care for this patient include:   Patient Care Team:  Rosanna Patrick MD as PCP - General (Internal Medicine)  Corazon Anderson APRN CNP as Assigned PCP    The following health maintenance items are reviewed in Epic and correct as of today:  Health Maintenance   Topic Date Due     COVID-19 Vaccine (5 - Booster for Moderna series) 07/16/2022     MEDICARE ANNUAL WELLNESS VISIT  11/30/2022     ANNUAL REVIEW OF HM ORDERS  12/01/2022     Pneumococcal Vaccine: 65+ Years (2 - PCV) 11/30/2022     MAMMO SCREENING  03/04/2023     FALL RISK ASSESSMENT  11/08/2023     DTAP/TDAP/TD IMMUNIZATION (3 - Td or Tdap) 01/29/2025     LIPID  11/30/2026     COLORECTAL CANCER SCREENING  10/02/2027     ADVANCE CARE PLANNING  11/08/2027     DEXA  04/10/2030     PHQ-2 (once per calendar year)  Completed     INFLUENZA VACCINE  Completed     ZOSTER IMMUNIZATION  Completed     HEPATITIS B IMMUNIZATION  Completed     IPV IMMUNIZATION  Aged Out     MENINGITIS IMMUNIZATION  Aged Out     HEPATITIS C SCREENING  Discontinued     PAP  Discontinued           FHS-7:   Breast CA Risk Assessment (FHS-7) 11/30/2021 11/7/2022   Did any of your first-degree relatives have breast or ovarian cancer? Yes Yes   Did any of your relatives have bilateral breast cancer? No No   Did any man in your family have breast cancer? No No   Did any woman in your family have breast and ovarian cancer? No No   Did any woman in your family have breast cancer before age 50 y? No No   Do you have 2 or more relatives with breast and/or ovarian cancer? No No   Do you have 2 or more relatives with breast and/or bowel cancer? No No       Mammogram Screening: Recommended mammography every 1-2 years with patient discussion and risk factor consideration  Pertinent mammograms are reviewed under the imaging tab.    Review of Systems   Constitutional: Negative for chills and fever.   HENT: Negative for congestion, ear pain, hearing loss and  "sore throat.    Eyes: Negative for pain.   Respiratory: Negative for cough and shortness of breath.    Cardiovascular: Negative for chest pain, palpitations and peripheral edema.   Gastrointestinal: Positive for heartburn. Negative for abdominal pain, constipation, diarrhea, hematochezia and nausea.   Breasts:  Negative for tenderness, breast mass and discharge.   Genitourinary: Negative for dysuria, frequency, genital sores, hematuria, pelvic pain, urgency, vaginal bleeding and vaginal discharge.   Musculoskeletal: Negative for arthralgias, joint swelling and myalgias.   Skin: Negative for rash.   Neurological: Negative for dizziness, weakness, headaches and paresthesias.   Psychiatric/Behavioral: Negative for mood changes. The patient is not nervous/anxious.          OBJECTIVE:   /60 (BP Location: Right arm, Patient Position: Sitting, Cuff Size: Adult Regular)   Pulse 62   Temp 98.2  F (36.8  C) (Temporal)   Resp 16   Ht 1.645 m (5' 4.76\")   Wt 61.8 kg (136 lb 4.8 oz)   SpO2 98%   BMI 22.85 kg/m   Estimated body mass index is 22.85 kg/m  as calculated from the following:    Height as of this encounter: 1.645 m (5' 4.76\").    Weight as of this encounter: 61.8 kg (136 lb 4.8 oz).  Physical Exam  GENERAL APPEARANCE: healthy, alert and no distress  EYES: Eyes grossly normal to inspection, PERRL and conjunctivae and sclerae normal  HENT: ear canals and TM's normal, nose and mouth without ulcers or lesions, oropharynx clear and oral mucous membranes moist  NECK: no adenopathy, no asymmetry, masses, or scars and thyroid normal to palpation  RESP: lungs clear to auscultation - no rales, rhonchi or wheezes  CV: regular rate and rhythm, normal S1 S2, no S3 or S4, no murmur, click or rub, no peripheral edema and peripheral pulses strong  MS: no musculoskeletal defects are noted and gait is age appropriate without ataxia  SKIN: no suspicious lesions or rashes  NEURO: Normal strength and tone, sensory exam grossly " "normal, mentation intact and speech normal  PSYCH: mentation appears normal and affect normal/bright    Diagnostic Test Results:  Labs reviewed in Epic    ASSESSMENT / PLAN:   (Z00.00) Routine general medical examination at a health care facility  (primary encounter diagnosis)  Comment: up to date on screening.   Plan: Lipid panel reflex to direct LDL Fasting, Basic        metabolic panel  (Ca, Cl, CO2, Creat, Gluc, K,         Na, BUN), Ferritin, CANCELED: X-RAY DEXA         HIP/PELVIS/SPINE            (Z78.0) Asymptomatic menopausal state  Comment: stopped hormone replacement last summer.       (I47.20) Ventricular tachycardia  Comment: followed at [facility]  Plan: metoprolol tartrate (LOPRESSOR) 50 MG tablet                    COUNSELING:  Reviewed preventive health counseling, as reflected in patient instructions       Regular exercise       Healthy diet/nutrition       Osteoporosis prevention/bone health    Estimated body mass index is 22.85 kg/m  as calculated from the following:    Height as of this encounter: 1.645 m (5' 4.76\").    Weight as of this encounter: 61.8 kg (136 lb 4.8 oz).        She reports that she has never smoked. She has never used smokeless tobacco.      Appropriate preventive services were discussed with this patient, including applicable screening as appropriate for cardiovascular disease, diabetes, osteopenia/osteoporosis, and glaucoma.  As appropriate for age/gender, discussed screening for colorectal cancer, prostate cancer, breast cancer, and cervical cancer. Checklist reviewing preventive services available has been given to the patient.    Reviewed patients plan of care and provided an AVS. The Basic Care Plan (routine screening as documented in Health Maintenance) for Marguerite meets the Care Plan requirement. This Care Plan has been established and reviewed with the Patient.    Counseling Resources:  ATP IV Guidelines  Pooled Cohorts Equation Calculator  Breast Cancer Risk " Calculator  Breast Cancer: Medication to Reduce Risk  FRAX Risk Assessment  ICSI Preventive Guidelines  Dietary Guidelines for Americans, 2010  TV Volume Wizard App's MyPlate  ASA Prophylaxis  Lung CA Screening    Rosanna Patrick MD  [facility]    Identified Health Risks:

## 2022-11-08 NOTE — PATIENT INSTRUCTIONS
I do recommend 1200 mg daily. (~200 mg in a cup of milk or yogurt).    I recommend [id number] international unit(s) vitamin D3 daily.           Preventive Health Recommendations    See your health care provider every year to  Review health changes.   Discuss preventive care.    Review your medicines if your doctor has prescribed any.    You no longer need a yearly Pap test unless you've had an abnormal Pap test in the past 10 years. If you have vaginal symptoms, such as bleeding or discharge, be sure to talk with your provider about a Pap test.    Every 1 to 2 years, have a mammogram.  If you are over 69, talk with your health care provider about whether or not you want to continue having screening mammograms.    Every 10 years, have a colonoscopy. Or, have a yearly FIT test (stool test). These exams will check for colon cancer.     Have a cholesterol test every 5 years, or more often if your doctor advises it.     Have a diabetes test (fasting glucose) every three years. If you are at risk for diabetes, you should have this test more often.     At age 65, have a bone density scan (DEXA) to check for osteoporosis (brittle bone disease).    Shots:  Get a flu shot each year.  Get a tetanus shot every 10 years.  Talk to your doctor about your pneumonia vaccines. There are now two you should receive - Pneumovax (PPSV 23) and Prevnar (PCV 13).  Talk to your pharmacist about the shingles vaccine.  Talk to your doctor about the hepatitis B vaccine.    Nutrition:   Eat at least 5 servings of fruits and vegetables each day.    Eat whole-grain bread, whole-wheat pasta and brown rice instead of white grains and rice.    Get adequate about Calcium and Vitamin D.     Lifestyle  Exercise at least 150 minutes a week (30 minutes a day, 5 days a week). This will help you control your weight and prevent disease.    Limit alcohol to one drink per day.    No smoking.     Wear sunscreen to prevent skin cancer.     See your dentist twice  a year for an exam and cleaning.    See your eye doctor every 1 to 2 years to screen for conditions such as glaucoma, macular degeneration, cataracts, etc.    Personalized Prevention Plan  You are due for the preventive services outlined below.  Your care team is available to assist you in scheduling these services.  If you have already completed any of these items, please share that information with your care team to update in your medical record.    Health Maintenance Due   Topic Date Due    COVID-19 Vaccine (5 - Booster for Moderna series) 07/16/2022    ANNUAL REVIEW OF HM ORDERS  12/01/2022

## 2022-11-14 ENCOUNTER — TRANSFERRED RECORDS (OUTPATIENT)
Dept: HEALTH INFORMATION MANAGEMENT | Facility: CLINIC | Age: 67
End: 2022-11-14

## 2023-05-31 ENCOUNTER — HOSPITAL ENCOUNTER (OUTPATIENT)
Dept: MAMMOGRAPHY | Facility: CLINIC | Age: 68
Discharge: HOME | End: 2023-05-31
Attending: OBSTETRICS & GYNECOLOGY | Admitting: OBSTETRICS & GYNECOLOGY
Payer: MEDICARE

## 2023-05-31 DIAGNOSIS — Z12.31 VISIT FOR SCREENING MAMMOGRAM: ICD-10-CM

## 2023-05-31 PROCEDURE — 77067 SCR MAMMO BI INCL CAD: CPT

## 2023-06-19 ENCOUNTER — ANCILLARY PROCEDURE (OUTPATIENT)
Dept: BONE DENSITY | Facility: CLINIC | Age: 68
End: 2023-06-19
Attending: OBSTETRICS & GYNECOLOGY
Payer: MEDICARE

## 2023-06-19 DIAGNOSIS — Z78.0 ASYMPTOMATIC MENOPAUSAL STATE: ICD-10-CM

## 2023-06-19 PROCEDURE — 77080 DXA BONE DENSITY AXIAL: CPT | Performed by: INTERNAL MEDICINE

## 2023-11-30 ENCOUNTER — OFFICE VISIT (OUTPATIENT)
Dept: PEDIATRICS | Facility: CLINIC | Age: 68
End: 2023-11-30
Payer: MEDICARE

## 2023-11-30 VITALS
BODY MASS INDEX: 23.49 KG/M2 | TEMPERATURE: 97.6 F | HEART RATE: 60 BPM | SYSTOLIC BLOOD PRESSURE: 114 MMHG | RESPIRATION RATE: 18 BRPM | OXYGEN SATURATION: 97 % | WEIGHT: 137.6 LBS | DIASTOLIC BLOOD PRESSURE: 64 MMHG | HEIGHT: 64 IN

## 2023-11-30 DIAGNOSIS — Z23 NEED FOR VACCINATION: ICD-10-CM

## 2023-11-30 DIAGNOSIS — I49.9 CARDIAC ARRHYTHMIA, UNSPECIFIED CARDIAC ARRHYTHMIA TYPE: ICD-10-CM

## 2023-11-30 DIAGNOSIS — Z00.00 ENCOUNTER FOR MEDICARE ANNUAL WELLNESS EXAM: Primary | ICD-10-CM

## 2023-11-30 DIAGNOSIS — Z13.220 SCREENING FOR LIPID DISORDERS: ICD-10-CM

## 2023-11-30 DIAGNOSIS — Z13.1 SCREENING FOR DIABETES MELLITUS: ICD-10-CM

## 2023-11-30 LAB
ALBUMIN SERPL BCG-MCNC: 4.5 G/DL (ref 3.5–5.2)
ALP SERPL-CCNC: 89 U/L (ref 40–150)
ALT SERPL W P-5'-P-CCNC: 18 U/L (ref 0–50)
ANION GAP SERPL CALCULATED.3IONS-SCNC: 10 MMOL/L (ref 7–15)
AST SERPL W P-5'-P-CCNC: 25 U/L (ref 0–45)
BILIRUB SERPL-MCNC: 0.7 MG/DL
BUN SERPL-MCNC: 20.6 MG/DL (ref 8–23)
CALCIUM SERPL-MCNC: 9.5 MG/DL (ref 8.8–10.2)
CHLORIDE SERPL-SCNC: 102 MMOL/L (ref 98–107)
CHOLEST SERPL-MCNC: 213 MG/DL
CREAT SERPL-MCNC: 0.77 MG/DL (ref 0.51–0.95)
DEPRECATED HCO3 PLAS-SCNC: 28 MMOL/L (ref 22–29)
EGFRCR SERPLBLD CKD-EPI 2021: 84 ML/MIN/1.73M2
GLUCOSE SERPL-MCNC: 98 MG/DL (ref 70–99)
HBA1C MFR BLD: 5.6 % (ref 0–5.6)
HDLC SERPL-MCNC: 60 MG/DL
LDLC SERPL CALC-MCNC: 133 MG/DL
NONHDLC SERPL-MCNC: 153 MG/DL
POTASSIUM SERPL-SCNC: 3.8 MMOL/L (ref 3.4–5.3)
PROT SERPL-MCNC: 7.1 G/DL (ref 6.4–8.3)
SODIUM SERPL-SCNC: 140 MMOL/L (ref 135–145)
TRIGL SERPL-MCNC: 102 MG/DL

## 2023-11-30 PROCEDURE — 36415 COLL VENOUS BLD VENIPUNCTURE: CPT

## 2023-11-30 PROCEDURE — G0439 PPPS, SUBSEQ VISIT: HCPCS | Mod: GC

## 2023-11-30 PROCEDURE — 90471 IMMUNIZATION ADMIN: CPT | Performed by: INTERNAL MEDICINE

## 2023-11-30 PROCEDURE — 80061 LIPID PANEL: CPT

## 2023-11-30 PROCEDURE — 83036 HEMOGLOBIN GLYCOSYLATED A1C: CPT | Mod: GZ

## 2023-11-30 PROCEDURE — 90678 RSV VACC PREF BIVALENT IM: CPT | Performed by: INTERNAL MEDICINE

## 2023-11-30 PROCEDURE — 80053 COMPREHEN METABOLIC PANEL: CPT

## 2023-11-30 RX ORDER — METOPROLOL TARTRATE 50 MG
TABLET ORAL
Qty: 180 TABLET | Refills: 3 | Status: SHIPPED | OUTPATIENT
Start: 2023-11-30

## 2023-11-30 ASSESSMENT — ENCOUNTER SYMPTOMS
ABDOMINAL PAIN: 0
DIZZINESS: 0
HEADACHES: 0
PALPITATIONS: 0
HEARTBURN: 0
SHORTNESS OF BREATH: 0
COUGH: 0
WEAKNESS: 0
FEVER: 0
DYSURIA: 0
HEMATURIA: 0
FREQUENCY: 0
CONSTIPATION: 0
MYALGIAS: 0
CHILLS: 0
EYE PAIN: 0
HEMATOCHEZIA: 0
DIARRHEA: 0
PARESTHESIAS: 0
BREAST MASS: 0
NAUSEA: 0
JOINT SWELLING: 0
ARTHRALGIAS: 0
NERVOUS/ANXIOUS: 0
SORE THROAT: 0

## 2023-11-30 ASSESSMENT — PAIN SCALES - GENERAL: PAINLEVEL: NO PAIN (0)

## 2023-11-30 ASSESSMENT — ACTIVITIES OF DAILY LIVING (ADL): CURRENT_FUNCTION: NO ASSISTANCE NEEDED

## 2023-11-30 NOTE — PATIENT INSTRUCTIONS
Dear Alley,    It was so great to meet you today! Please get your Pneumonia vaccine at your local pharmacy. We will see you in a year or sooner if needed!    Steven,  Ivelisse Dickinson MD      Patient Education   Personalized Prevention Plan  You are due for the preventive services outlined below.  Your care team is available to assist you in scheduling these services.  If you have already completed any of these items, please share that information with your care team to update in your medical record.  Health Maintenance Due   Topic Date Due    RSV VACCINE (Pregnancy & 60+) (1 - 1-dose 60+ series) Never done    Pneumococcal Vaccine (2 - PCV) 11/30/2022    ANNUAL REVIEW OF HM ORDERS  12/01/2022    Annual Wellness Visit  11/08/2023        Preventive Health Recommendations    See your health care provider every year to  Review health changes.   Discuss preventive care.    Review your medicines if your doctor has prescribed any.    You no longer need a yearly Pap test unless you've had an abnormal Pap test in the past 10 years. If you have vaginal symptoms, such as bleeding or discharge, be sure to talk with your provider about a Pap test.    Every 1 to 2 years, have a mammogram.  If you are over 69, talk with your health care provider about whether or not you want to continue having screening mammograms.    Every 10 years, have a colonoscopy. Or, have a yearly FIT test (stool test). These exams will check for colon cancer.     Have a cholesterol test every 5 years, or more often if your doctor advises it.     Have a diabetes test (fasting glucose) every three years. If you are at risk for diabetes, you should have this test more often.     At age 65, have a bone density scan (DEXA) to check for osteoporosis (brittle bone disease).    Shots:  Get a flu shot each year.  Get a tetanus shot every 10 years.  Talk to your doctor about your pneumonia vaccines. There are now two you should receive - Pneumovax (PPSV 23) and Prevnar  (PCV 13).  Talk to your pharmacist about the shingles vaccine.  Talk to your doctor about the hepatitis B vaccine.    Nutrition:   Eat at least 5 servings of fruits and vegetables each day.    Eat whole-grain bread, whole-wheat pasta and brown rice instead of white grains and rice.    Get adequate about Calcium and Vitamin D.     Lifestyle  Exercise at least 150 minutes a week (30 minutes a day, 5 days a week). This will help you control your weight and prevent disease.    Limit alcohol to one drink per day.    No smoking.     Wear sunscreen to prevent skin cancer.     See your dentist twice a year for an exam and cleaning.    See your eye doctor every 1 to 2 years to screen for conditions such as glaucoma, macular degeneration, cataracts, etc.    Personalized Prevention Plan  You are due for the preventive services outlined below.  Your care team is available to assist you in scheduling these services.  If you have already completed any of these items, please share that information with your care team to update in your medical record.    Health Maintenance Due   Topic Date Due    RSV VACCINE (Pregnancy & 60+) (1 - 1-dose 60+ series) Never done    Pneumococcal Vaccine (2 - PCV) 11/30/2022    ANNUAL REVIEW OF HM ORDERS  12/01/2022    Annual Wellness Visit  11/08/2023

## 2023-11-30 NOTE — PROGRESS NOTES
"SUBJECTIVE:   Marguerite is a 67 year old, presenting for the following:  Medicare Visit    No specific concerns today. Lives at home with . One daughter is nearby in [place], other daughter lives in [facility]. Enjoys walking her dog daily.         11/30/2023    12:54 PM   Additional Questions   Roomed by Ashly   Accompanied by none         11/30/2023    12:54 PM   Patient Reported Additional Medications   Patient reports taking the following new medications none       Are you in the first 12 months of your Medicare coverage?  No    Healthy Habits:     In general, how would you rate your overall health?  Good    Duration of exercise:  30-45 minutes    Do you usually eat at least 4 servings of fruit and vegetables a day, include whole grains    & fiber and avoid regularly eating high fat or \"junk\" foods?  Yes    Taking medications regularly:  Yes    Medication side effects:  None    Ability to successfully perform activities of daily living:  No assistance needed    Home Safety:  No safety concerns identified    Hearing Impairment:  No hearing concerns    In the past 6 months, have you been bothered by leaking of urine?  No    In general, how would you rate your overall mental or emotional health?  Excellent    Additional concerns today:  No      Today's PHQ-2 Score:       11/30/2023    12:01 PM   PHQ-2 ( 1999 Pfizer)   Q1: Little interest or pleasure in doing things 0   Q2: Feeling down, depressed or hopeless 0   PHQ-2 Score 0   Q1: Little interest or pleasure in doing things Not at all   Q2: Feeling down, depressed or hopeless Not at all   PHQ-2 Score 0       Have you ever done Advance Care Planning? (For example, a Health Directive, POLST, or a discussion with a medical provider or your loved ones about your wishes): Yes, patient states has an Advance Care Planning document and will bring a copy to the clinic.       Fall risk  Fallen 2 or more times in the past year?: No  Any fall with injury in the past " year?: No    Cognitive Screening   1) Repeat 3 items (Leader, Season, Table)    2) Clock draw: NORMAL  3) 3 item recall: Recalls 3 objects  Results: 3 items recalled: COGNITIVE IMPAIRMENT LESS LIKELY    Mini-CogTM Copyright S Ela. Licensed by the author for use in [facility]; reprinted with permission (adriane@[facility]). All rights reserved.        Reviewed and updated as needed this visit by clinical staff   Tobacco  Allergies  Meds              Reviewed and updated as needed this visit by Provider                 Social History     Tobacco Use    Smoking status: Never    Smokeless tobacco: Never   Substance Use Topics    Alcohol use: Yes     Comment: infrequent glass of wine             11/30/2023    12:01 PM   Alcohol Use   Prescreen: >3 drinks/day or >7 drinks/week? No          No data to display              Do you have a current opioid prescription? No  Do you use any other controlled substances or medications that are not prescribed by a provider? None        Current providers sharing in care for this patient include:   Patient Care Team:  Rosanna Patrick MD as PCP - General (Internal Medicine)  Rosanna Patrick MD as Assigned PCP    The following health maintenance items are reviewed in Epic and correct as of today:  Health Maintenance   Topic Date Due    RSV VACCINE (Pregnancy & 60+) (1 - 1-dose 60+ series) Never done    Pneumococcal Vaccine: 65+ Years (2 - PCV) 11/30/2022    ANNUAL REVIEW OF HM ORDERS  12/01/2022    MEDICARE ANNUAL WELLNESS VISIT  11/08/2023    FALL RISK ASSESSMENT  11/30/2024    DTAP/TDAP/TD IMMUNIZATION (2 - Td or Tdap) 01/29/2025    MAMMO SCREENING  05/31/2025    COLORECTAL CANCER SCREENING  10/02/2027    LIPID  11/08/2027    ADVANCE CARE PLANNING  11/08/2027    DEXA  06/19/2038    PHQ-2 (once per calendar year)  Completed    INFLUENZA VACCINE  Completed    ZOSTER IMMUNIZATION  Completed    COVID-19 Vaccine  Completed    IPV IMMUNIZATION  Aged Out    HPV IMMUNIZATION  Aged  Out    MENINGITIS IMMUNIZATION  Aged Out    RSV MONOCLONAL ANTIBODY  Aged Out    HEPATITIS C SCREENING  Discontinued    PAP  Discontinued       FHS-7:       11/30/2021     2:13 PM 11/7/2022     1:31 PM 5/31/2023     9:05 AM 11/30/2023    12:04 PM   Breast CA Risk Assessment (FHS-7)   Did any of your first-degree relatives have breast or ovarian cancer? Yes Yes Yes Yes   Did any of your relatives have bilateral breast cancer? No No No No   Did any man in your family have breast cancer? No No No No   Did any woman in your family have breast and ovarian cancer? No No No No   Did any woman in your family have breast cancer before age 50 y? No No No No   Do you have 2 or more relatives with breast and/or ovarian cancer? No No No No   Do you have 2 or more relatives with breast and/or bowel cancer? No No No No       Mammogram Screening: Recommended mammography every 1-2 years with patient discussion and risk factor consideration  Pertinent mammograms are reviewed under the imaging tab.    Review of Systems   Constitutional:  Negative for chills and fever.   HENT:  Negative for congestion, ear pain, hearing loss and sore throat.    Eyes:  Negative for pain and visual disturbance.   Respiratory:  Negative for cough and shortness of breath.    Cardiovascular:  Negative for chest pain, palpitations and peripheral edema.   Gastrointestinal:  Negative for abdominal pain, constipation, diarrhea, heartburn, hematochezia and nausea.   Breasts:  Negative for tenderness, breast mass and discharge.   Genitourinary:  Negative for dysuria, frequency, genital sores, hematuria, pelvic pain, urgency, vaginal bleeding and vaginal discharge.   Musculoskeletal:  Negative for arthralgias, joint swelling and myalgias.   Skin:  Negative for rash.   Neurological:  Negative for dizziness, weakness, headaches and paresthesias.   Psychiatric/Behavioral:  Negative for mood changes. The patient is not nervous/anxious.        OBJECTIVE:   LMP  (LMP  "Unknown)  Estimated body mass index is 22.85 kg/m  as calculated from the following:    Height as of 11/8/22: 1.645 m (5' 4.76\").    Weight as of 11/8/22: 61.8 kg (136 lb 4.8 oz).  Physical Exam  GENERAL APPEARANCE: healthy, alert and no distress  EYES: Eyes grossly normal to inspection, PERRL and conjunctivae and sclerae normal  HENT: ear canals and TM's normal, nose and mouth without ulcers or lesions, oropharynx clear and oral mucous membranes moist  NECK: no adenopathy, no asymmetry, masses, or scars and thyroid normal to palpation  RESP: lungs clear to auscultation - no rales, rhonchi or wheezes  CV: regular rate and rhythm, normal S1 S2, no S3 or S4, no murmur, click or rub, no peripheral edema and peripheral pulses strong  ABDOMEN: soft, nontender, no hepatosplenomegaly, no masses and bowel sounds normal  MS: no musculoskeletal defects are noted and gait is age appropriate without ataxia  SKIN: no suspicious lesions or rashes  NEURO: Normal strength and tone, sensory exam grossly normal, mentation intact and speech normal  PSYCH: mentation appears normal and affect normal/bright    ASSESSMENT / PLAN:     \"Alley\" is a 68yo F presenting for medicare annual wellness visit.    1. Encounter for Medicare annual wellness exam    2. Cardiac arrhythmia, unspecified cardiac arrhythmia type  Followed by [facility] for arrhythmia and family hx of aortic aneurysm in mother. Last echo in 07/2022 was normal and without evidence of any stress-induced ischemia.   - metoprolol tartrate (LOPRESSOR) 50 MG tablet; TAKE 1 TABLET(50 MG) BY MOUTH TWICE DAILY  Dispense: 180 tablet; Refill: 3    3. Screening for lipid disorders  Last ACSVD of 4.5% based on lipid panel from 2022.  - Lipid panel reflex to direct LDL Non-fasting    4. Need for vaccination  - respiratory syncytial virus vaccine, bivalent (ABRYSVO) injection 0.5 mL  - Recommend getting Pneumonia vaccine at outpatient Pharmacy     5. Screening for diabetes mellitus  Has not had " recent A1c.  - Hemoglobin A1c    6. Encounter for screening for metabolic disorder  - Comprehensive metabolic panel (BMP + Alb, Alk Phos, ALT, AST, Total. Bili, TP)      COUNSELING:  Reviewed preventive health counseling, as reflected in patient instructions       Regular exercise       Healthy diet/nutrition       Dental care       Fall risk prevention       Immunizations  Vaccinated for: RSV         Alcohol Use        Colon cancer screening    She reports that she has never smoked. She has never used smokeless tobacco.    Appropriate preventive services were discussed with this patient, including applicable screening as appropriate for fall prevention, nutrition, physical activity, Tobacco-use cessation, weight loss and cognition.  Checklist reviewing preventive services available has been given to the patient.    Reviewed patients plan of care and provided an AVS. The Basic Care Plan (routine screening as documented in Health Maintenance) for Marguerite meets the Care Plan requirement. This Care Plan has been established and reviewed with the Patient.          Ivelisse Dickinson MD  [facility]    ===========  STAFF NOTE:  Patient seen with resident physician today.  I was physically present during key portions of the visit and participated in the evaluation and management of the patient today.     Magdaleno Ryan MD

## 2024-06-25 ENCOUNTER — HOSPITAL ENCOUNTER (OUTPATIENT)
Dept: MAMMOGRAPHY | Facility: CLINIC | Age: 69
Discharge: HOME | End: 2024-06-25
Attending: OBSTETRICS & GYNECOLOGY | Admitting: OBSTETRICS & GYNECOLOGY
Payer: MEDICARE

## 2024-06-25 DIAGNOSIS — Z12.31 VISIT FOR SCREENING MAMMOGRAM: ICD-10-CM

## 2024-06-25 PROCEDURE — 77063 BREAST TOMOSYNTHESIS BI: CPT

## 2024-07-25 ENCOUNTER — TRANSFERRED RECORDS (OUTPATIENT)
Dept: HEALTH INFORMATION MANAGEMENT | Facility: CLINIC | Age: 69
End: 2024-07-25
Payer: MEDICARE

## 2024-11-12 ENCOUNTER — MYC MEDICAL ADVICE (OUTPATIENT)
Dept: PEDIATRICS | Facility: CLINIC | Age: 69
End: 2024-11-12
Payer: MEDICARE

## 2024-12-06 SDOH — HEALTH STABILITY: PHYSICAL HEALTH: ON AVERAGE, HOW MANY MINUTES DO YOU ENGAGE IN EXERCISE AT THIS LEVEL?: 30 MIN

## 2024-12-06 SDOH — HEALTH STABILITY: PHYSICAL HEALTH: ON AVERAGE, HOW MANY DAYS PER WEEK DO YOU ENGAGE IN MODERATE TO STRENUOUS EXERCISE (LIKE A BRISK WALK)?: 5 DAYS

## 2024-12-06 ASSESSMENT — SOCIAL DETERMINANTS OF HEALTH (SDOH): HOW OFTEN DO YOU GET TOGETHER WITH FRIENDS OR RELATIVES?: ONCE A WEEK

## 2024-12-09 ENCOUNTER — OFFICE VISIT (OUTPATIENT)
Dept: PEDIATRICS | Facility: CLINIC | Age: 69
End: 2024-12-09
Payer: MEDICARE

## 2024-12-09 VITALS
BODY MASS INDEX: 23.1 KG/M2 | TEMPERATURE: 98.2 F | OXYGEN SATURATION: 98 % | WEIGHT: 135.3 LBS | DIASTOLIC BLOOD PRESSURE: 70 MMHG | HEIGHT: 64 IN | RESPIRATION RATE: 20 BRPM | SYSTOLIC BLOOD PRESSURE: 100 MMHG | HEART RATE: 58 BPM

## 2024-12-09 DIAGNOSIS — E78.5 HYPERLIPIDEMIA LDL GOAL <130: ICD-10-CM

## 2024-12-09 DIAGNOSIS — Z00.00 ENCOUNTER FOR MEDICARE ANNUAL WELLNESS EXAM: Primary | ICD-10-CM

## 2024-12-09 DIAGNOSIS — Z13.1 SCREENING FOR DIABETES MELLITUS: ICD-10-CM

## 2024-12-09 DIAGNOSIS — H91.93 HEARING DIFFICULTY OF BOTH EARS: ICD-10-CM

## 2024-12-09 DIAGNOSIS — R07.89 CHEST PRESSURE: ICD-10-CM

## 2024-12-09 DIAGNOSIS — I47.10 PSVT (PAROXYSMAL SUPRAVENTRICULAR TACHYCARDIA) (H): ICD-10-CM

## 2024-12-09 DIAGNOSIS — N39.41 URGE INCONTINENCE: ICD-10-CM

## 2024-12-09 DIAGNOSIS — I49.9 CARDIAC ARRHYTHMIA, UNSPECIFIED CARDIAC ARRHYTHMIA TYPE: ICD-10-CM

## 2024-12-09 LAB
EST. AVERAGE GLUCOSE BLD GHB EST-MCNC: 114 MG/DL
HBA1C MFR BLD: 5.6 % (ref 0–5.6)

## 2024-12-09 PROCEDURE — 80048 BASIC METABOLIC PNL TOTAL CA: CPT | Performed by: NURSE PRACTITIONER

## 2024-12-09 PROCEDURE — 80061 LIPID PANEL: CPT | Performed by: NURSE PRACTITIONER

## 2024-12-09 PROCEDURE — 99214 OFFICE O/P EST MOD 30 MIN: CPT | Mod: 25 | Performed by: NURSE PRACTITIONER

## 2024-12-09 PROCEDURE — 93000 ELECTROCARDIOGRAM COMPLETE: CPT | Performed by: NURSE PRACTITIONER

## 2024-12-09 PROCEDURE — 36415 COLL VENOUS BLD VENIPUNCTURE: CPT | Performed by: NURSE PRACTITIONER

## 2024-12-09 PROCEDURE — 83036 HEMOGLOBIN GLYCOSYLATED A1C: CPT | Performed by: NURSE PRACTITIONER

## 2024-12-09 PROCEDURE — G0439 PPPS, SUBSEQ VISIT: HCPCS | Performed by: NURSE PRACTITIONER

## 2024-12-09 RX ORDER — METOPROLOL TARTRATE 50 MG
TABLET ORAL
Qty: 180 TABLET | Refills: 3 | Status: SHIPPED | OUTPATIENT
Start: 2024-12-09

## 2024-12-09 NOTE — PROGRESS NOTES
Preventive Care Visit  [facility] Patel NP, Family Medicine  Dec 9, 2024      Assessment & Plan     Encounter for Medicare annual wellness exam  Routine exam performed today. Age appropriate screening and preventative care have been addressed today.   Vaccinations have been declined Recommend annual vision exams as well as biannual dental exams. They will follow up for annual physical again in one year.     Cardiac arrhythmia, unspecified cardiac arrhythmia type  PSVT (paroxysmal supraventricular tachycardia) (H)  Stable.  Followed by EP at [place].  Patient considering switching to EP at [facility].  Continue with metoprolol  - metoprolol tartrate (LOPRESSOR) 50 MG tablet; TAKE 1 TABLET(50 MG) BY MOUTH TWICE DAILY  - EKG 12-lead complete w/read - Clinics    Urge incontinence  Stable.  Continue with Ditropan.  Managed by OBGYN.    Hearing difficulty of both ears  Referral given for audiogram  - Adult Audiology  Referral; Future    Chest pressure  New.  EKG done today showed sinus bradycardia.  Referral given for echo stress test.  - EKG 12-lead complete w/read - Clinics  - Echocardiogram Exercise Stress; Future    Hyperlipidemia LDL goal <130  Stable.  Labs repeat.  - Lipid panel reflex to direct LDL Non-fasting    Screening for diabetes mellitus  - Hemoglobin A1c  - Basic metabolic panel  (Ca, Cl, CO2, Creat, Gluc, K, Na, BUN)  Lab Results   Component Value Date    A1C 5.6 12/09/2024    A1C 5.6 11/30/2023     Counseling  Appropriate preventive services were addressed with this patient via screening, questionnaire, or discussion as appropriate for fall prevention, nutrition, physical activity, Tobacco-use cessation, social engagement, weight loss and cognition.  Checklist reviewing preventive services available has been given to the patient.  Reviewed patient's diet, addressing concerns and/or questions.       Nicholas Everett is a 69 year old, presenting for the  following:    Physical        12/9/2024     1:41 PM   Additional Questions   Roomed by miss   Accompanied by self         12/9/2024     1:41 PM   Patient Reported Additional Medications   Patient reports taking the following new medications no       HPI    Health Care Directive  Patient does not have a Health Care Directive: Patient states has Advance Directive and will bring in a copy to clinic.        12/6/2024   General Health   How would you rate your overall physical health? Good   Feel stress (tense, anxious, or unable to sleep) Only a little      (!) STRESS CONCERN      12/6/2024   Nutrition   Diet: Regular (no restrictions)          12/6/2024   Exercise   Days per week of moderate/strenous exercise 5 days   Average minutes spent exercising at this level 30 min     Walking   Lifting weight        12/6/2024   Social Factors   Frequency of gathering with friends or relatives Once a week   Worry food won't last until get money to buy more No   Food not last or not have enough money for food? No   Do you have housing? (Housing is defined as stable permanent housing and does not include staying ouside in a car, in a tent, in an abandoned building, in an overnight shelter, or couch-surfing.) Yes   Are you worried about losing your housing? No   Lack of transportation? No   Unable to get utilities (heat,electricity)? No          12/6/2024   Fall Risk   Fallen 2 or more times in the past year? No     No    Trouble with walking or balance? No     No        Patient-reported    Multiple values from one day are sorted in reverse-chronological order          12/6/2024   Activities of Daily Living- Home Safety   Needs help with the following daily activites None of the above   Safety concerns in the home None of the above            12/6/2024   Dental   Dentist two times every year? Yes           12/6/2024   Hearing Screening   Hearing concerns? None of the above            12/6/2024   Driving Risk Screening    Patient/family members have concerns about driving No          12/6/2024   General Alertness/Fatigue Screening   Have you been more tired than usual lately? No         12/6/2024   Urinary Incontinence Screening   Bothered by leaking urine in past 6 months No          12/6/2024   TB Screening   Were you born outside of the [place]? No        Today's PHQ-2 Score:       12/8/2024     4:31 PM   PHQ-2 ( 1999 Pfizer)   Q1: Little interest or pleasure in doing things 0    Q2: Feeling down, depressed or hopeless 0    PHQ-2 Score 0    Q1: Little interest or pleasure in doing things Not at all   Q2: Feeling down, depressed or hopeless Not at all   PHQ-2 Score 0       Patient-reported           12/6/2024   Substance Use   Alcohol more than 3/day or more than 7/wk No   Do you have a current opioid prescription? No   How severe/bad is pain from 1 to 10? 0/10 (No Pain)   Do you use any other substances recreationally? No        Social History     Tobacco Use    Smoking status: Never    Smokeless tobacco: Never   Substance Use Topics    Alcohol use: Yes     Comment: infrequent glass of wine    Drug use: No           6/25/2024   LAST FHS-7 RESULTS   1st degree relative breast or ovarian cancer Yes  Younger sister dx 50's.     Any relative bilateral breast cancer No   Any male have breast cancer No   Any ONE woman have BOTH breast AND ovarian cancer No   Any woman with breast cancer before 50yrs No   2 or more relatives with breast AND/OR ovarian cancer No   2 or more relatives with breast AND/OR bowel cancer No      No genetic testing that she is aware of.  Mammogram Screening - Mammogram every 1-2 years updated in Health Maintenance based on mutual decision making  Mammogram  6/2024 BIRADs 2    Colon cancer screening: colonoscopy 2017 at [facility].  Repeat 10 years.    DEXA: 6/2023.  Osteopenia.  5 year follow-up. Calcium and vitamin D daily.    History of abnormal Pap smear: No - age 65 or older with adequate negative prior screening  test results (3 consecutive negative cytology results, 2 consecutive negative cotesting results, or 2 consecutive negative HrHPV test results within 10 years, with the most recent test occurring within the recommended screening interval for the test used)       ASCVD Risk   The 10-year ASCVD risk score (Vernon ULLOA, et al., 2019) is: 5.4%    Values used to calculate the score:      Age: 69 years      Sex: Female      Is Non- : No      Diabetic: No      Tobacco smoker: No      Systolic Blood Pressure: 100 mmHg      Is BP treated: No      HDL Cholesterol: 60 mg/dL      Total Cholesterol: 213 mg/dL    SVT  -Takes metoprolol daily  -Followed by EP at [place]  -No side effects.    Urge incontinence:  -Stable.  Takes Ditropan     Pressure in chest.  Intermittent.  Started in the last year.  Episodes are increasing.  Last episode was during the night.  Radiates to back.  Unsure if it is heartburn  Hx of echo stress in 2022 but negative.    Requesting referral for audiogram.   states she cannot hear as well.    Reviewed and updated as needed this visit by Provider                  Current providers sharing in care for this patient include:  Patient Care Team:  Rosanna Patrick MD as PCP - General (Internal Medicine)  Ivelisse Dickinson MD as Assigned PCP    The following health maintenance items are reviewed in Epic and correct as of today:  Health Maintenance   Topic Date Due    Pneumococcal Vaccine: 65+ Years (2 of 2 - PCV) 11/30/2022    ANNUAL REVIEW OF HM ORDERS  11/30/2024    MEDICARE ANNUAL WELLNESS VISIT  11/30/2024    DTAP/TDAP/TD IMMUNIZATION (2 - Td or Tdap) 01/29/2025    FALL RISK ASSESSMENT  12/09/2025    MAMMO SCREENING  06/25/2026    GLUCOSE  11/30/2026    COLORECTAL CANCER SCREENING  10/02/2027    LIPID  11/30/2028    ADVANCE CARE PLANNING  12/01/2028    DEXA  06/19/2038    PHQ-2 (once per calendar year)  Completed    INFLUENZA VACCINE  Completed    ZOSTER IMMUNIZATION   "Completed    RSV VACCINE  Completed    COVID-19 Vaccine  Completed    HPV IMMUNIZATION  Aged Out    MENINGITIS IMMUNIZATION  Aged Out    RSV MONOCLONAL ANTIBODY  Aged Out    HEPATITIS C SCREENING  Discontinued    PAP  Discontinued       Review of Systems  Constitutional, HEENT, cardiovascular, pulmonary, gi and gu systems are negative, except as otherwise noted.     Objective    Exam  /70 (BP Location: Right arm, Patient Position: Sitting, Cuff Size: Adult Regular)   Pulse 58   Temp 98.2  F (36.8  C)   Resp 20   Ht 1.635 m (5' 4.37\")   Wt 61.4 kg (135 lb 4.8 oz)   LMP  (LMP Unknown)   SpO2 98%   BMI 22.96 kg/m     Estimated body mass index is 22.96 kg/m  as calculated from the following:    Height as of this encounter: 1.635 m (5' 4.37\").    Weight as of this encounter: 61.4 kg (135 lb 4.8 oz).    Physical Exam  GENERAL: alert and no distress  EYES: Eyes grossly normal to inspection, PERRL and conjunctivae and sclerae normal  HENT: ear canals and TM's normal, nose and mouth without ulcers or lesions  NECK: no adenopathy, no asymmetry, masses, or scars  RESP: lungs clear to auscultation - no rales, rhonchi or wheezes  CV: regular rate and rhythm, normal S1 S2, no S3 or S4, no murmur, click or rub, no peripheral edema  ABDOMEN: soft, nontender, no hepatosplenomegaly, no masses and bowel sounds normal  MS: no gross musculoskeletal defects noted, no edema  SKIN: no suspicious lesions or rashes  NEURO: Normal strength and tone, mentation intact and speech normal  PSYCH: mentation appears normal, affect normal/bright        12/9/2024   Mini Cog   Clock Draw Score 2 Normal   3 Item Recall 3 objects recalled   Mini Cog Total Score 5                 Signed Electronically by: Cielo Patel NP    "

## 2024-12-09 NOTE — PATIENT INSTRUCTIONS
Patient Education   Preventive Care Advice   This is general advice given by our system to help you stay healthy. However, your care team may have specific advice just for you. Please talk to your care team about your preventive care needs.  Nutrition  Eat 5 or more servings of fruits and vegetables each day.  Try wheat bread, brown rice and whole grain pasta (instead of white bread, rice, and pasta).  Get enough calcium and vitamin D. Check the label on foods and aim for 100% of the RDA (recommended daily allowance).  Lifestyle  Exercise at least 150 minutes each week  (30 minutes a day, 5 days a week).  Do muscle strengthening activities 2 days a week. These help control your weight and prevent disease.  No smoking.  Wear sunscreen to prevent skin cancer.  Have a dental exam and cleaning every 6 months.  Yearly exams  See your health care team every year to talk about:  Any changes in your health.  Any medicines your care team has prescribed.  Preventive care, family planning, and ways to prevent chronic diseases.  Shots (vaccines)   HPV shots (up to age 26), if you've never had them before.  Hepatitis B shots (up to age 59), if you've never had them before.  COVID-19 shot: Get this shot when it's due.  Flu shot: Get a flu shot every year.  Tetanus shot: Get a tetanus shot every 10 years.  Pneumococcal, hepatitis A, and RSV shots: Ask your care team if you need these based on your risk.  Shingles shot (for age 50 and up)  General health tests  Diabetes screening:  Starting at age 35, Get screened for diabetes at least every 3 years.  If you are younger than age 35, ask your care team if you should be screened for diabetes.  Cholesterol test: At age 39, start having a cholesterol test every 5 years, or more often if advised.  Bone density scan (DEXA): At age 50, ask your care team if you should have this scan for osteoporosis (brittle bones).  Hepatitis C: Get tested at least once in your life.  STIs (sexually  transmitted infections)  Before age 24: Ask your care team if you should be screened for STIs.  After age 24: Get screened for STIs if you're at risk. You are at risk for STIs (including HIV) if:  You are sexually active with more than one person.  You don't use condoms every time.  You or a partner was diagnosed with a sexually transmitted infection.  If you are at risk for HIV, ask about PrEP medicine to prevent HIV.  Get tested for HIV at least once in your life, whether you are at risk for HIV or not.  Cancer screening tests  Cervical cancer screening: If you have a cervix, begin getting regular cervical cancer screening tests starting at age 21.  Breast cancer scan (mammogram): If you've ever had breasts, begin having regular mammograms starting at age 40. This is a scan to check for breast cancer.  Colon cancer screening: It is important to start screening for colon cancer at age 45.  Have a colonoscopy test every 10 years (or more often if you're at risk) Or, ask your provider about stool tests like a FIT test every year or Cologuard test every 3 years.  To learn more about your testing options, visit:   .  For help making a decision, visit:   https://bit.ly/ze96888.  Prostate cancer screening test: If you have a prostate, ask your care team if a prostate cancer screening test (PSA) at age 55 is right for you.  Lung cancer screening: If you are a current or former smoker ages 50 to 80, ask your care team if ongoing lung cancer screenings are right for you.  For informational purposes only. Not to replace the advice of your health care provider. Copyright   2023 [employer]. All rights reserved. Clinically reviewed by the [facility] Transitions Program. Vaughn Burton [id number] - REV 01/24.

## 2024-12-10 DIAGNOSIS — R94.4 DECREASED GFR: Primary | ICD-10-CM

## 2024-12-10 LAB
ANION GAP SERPL CALCULATED.3IONS-SCNC: 10 MMOL/L (ref 7–15)
BUN SERPL-MCNC: 21.6 MG/DL (ref 8–23)
CALCIUM SERPL-MCNC: 9.5 MG/DL (ref 8.8–10.4)
CHLORIDE SERPL-SCNC: 100 MMOL/L (ref 98–107)
CHOLEST SERPL-MCNC: 249 MG/DL
CREAT SERPL-MCNC: 1.02 MG/DL (ref 0.51–0.95)
EGFRCR SERPLBLD CKD-EPI 2021: 59 ML/MIN/1.73M2
FASTING STATUS PATIENT QL REPORTED: ABNORMAL
FASTING STATUS PATIENT QL REPORTED: ABNORMAL
GLUCOSE SERPL-MCNC: 93 MG/DL (ref 70–99)
HCO3 SERPL-SCNC: 29 MMOL/L (ref 22–29)
HDLC SERPL-MCNC: 79 MG/DL
LDLC SERPL CALC-MCNC: 151 MG/DL
NONHDLC SERPL-MCNC: 170 MG/DL
POTASSIUM SERPL-SCNC: 4.1 MMOL/L (ref 3.4–5.3)
SODIUM SERPL-SCNC: 139 MMOL/L (ref 135–145)
TRIGL SERPL-MCNC: 94 MG/DL

## 2025-02-24 ENCOUNTER — MYC MEDICAL ADVICE (OUTPATIENT)
Dept: PEDIATRICS | Facility: CLINIC | Age: 70
End: 2025-02-24

## 2025-02-24 ENCOUNTER — HOSPITAL ENCOUNTER (OUTPATIENT)
Dept: CARDIOLOGY | Facility: CLINIC | Age: 70
Discharge: HOME | End: 2025-02-24
Attending: NURSE PRACTITIONER | Admitting: NURSE PRACTITIONER
Payer: MEDICARE

## 2025-02-24 DIAGNOSIS — R07.89 CHEST PRESSURE: ICD-10-CM

## 2025-02-24 DIAGNOSIS — R94.39 ABNORMAL CARDIOVASCULAR STRESS TEST: Primary | ICD-10-CM

## 2025-02-24 PROCEDURE — 93321 DOPPLER ECHO F-UP/LMTD STD: CPT | Mod: 26 | Performed by: INTERNAL MEDICINE

## 2025-02-24 PROCEDURE — 93018 CV STRESS TEST I&R ONLY: CPT | Performed by: INTERNAL MEDICINE

## 2025-02-24 PROCEDURE — 93325 DOPPLER ECHO COLOR FLOW MAPG: CPT | Mod: 26 | Performed by: INTERNAL MEDICINE

## 2025-02-24 PROCEDURE — 93350 STRESS TTE ONLY: CPT | Mod: 26 | Performed by: INTERNAL MEDICINE

## 2025-02-24 PROCEDURE — 93016 CV STRESS TEST SUPVJ ONLY: CPT | Performed by: INTERNAL MEDICINE

## 2025-02-24 PROCEDURE — 93325 DOPPLER ECHO COLOR FLOW MAPG: CPT | Mod: TC

## 2025-02-24 PROCEDURE — 93350 STRESS TTE ONLY: CPT | Mod: TC

## 2025-02-24 NOTE — TELEPHONE ENCOUNTER
Patient responded to EKG message, yes she would like to be referred in [place].     MARTIN Sommer on 2/24/2025 at 4:51 PM

## 2025-02-25 ENCOUNTER — TRANSFERRED RECORDS (OUTPATIENT)
Dept: HEALTH INFORMATION MANAGEMENT | Facility: CLINIC | Age: 70
End: 2025-02-25
Payer: MEDICARE

## 2025-02-25 ENCOUNTER — MYC MEDICAL ADVICE (OUTPATIENT)
Dept: PEDIATRICS | Facility: CLINIC | Age: 70
End: 2025-02-25
Payer: MEDICARE

## 2025-02-25 DIAGNOSIS — E78.5 HYPERLIPIDEMIA LDL GOAL <130: ICD-10-CM

## 2025-02-25 DIAGNOSIS — R94.39 ABNORMAL CARDIOVASCULAR STRESS TEST: Primary | ICD-10-CM

## 2025-02-25 NOTE — TELEPHONE ENCOUNTER
Cielo Patel NP    See mychart, please review and advise  -Patient scheduled with cardiology on 4/9/25 as advised. Patient is anxious regarding echo result and would prefer sooner appointment  -Patient requests provider recommendation regarding cardiac calcium test prior to cardiology appointment.     Per ECHO result note 2/24/25  Dear Marguerite,     Thank you for getting your echocardiogram done.  There was an EKG change noted during the stress echo that we need you to follow-up with the cardiologist about.  Did you want to be referred within [facility]?  I believe your previous cardiologist was at [place].     Please let me know if you have any questions,     ADALBERTO Patel, FARNAZ  Family Medicine   Written by Cielo Patel NP on 2/24/2025  3:17 PM CST  Seen by patient Marguerite R Jose on 2/24/2025  5:36 PM    Per visit note 12/9/24:  Cardiac arrhythmia, unspecified cardiac arrhythmia type  PSVT (paroxysmal supraventricular tachycardia) (H)  Stable.  Followed by EP at [place].  Patient considering switching to EP at [facility].  Continue with metoprolol  - metoprolol tartrate (LOPRESSOR) 50 MG tablet; TAKE 1 TABLET(50 MG) BY MOUTH TWICE DAILY  - EKG 12-lead complete w/read - Clinics     Chest pressure  New.  EKG done today showed sinus bradycardia.  Referral given for echo stress test.  - EKG 12-lead complete w/read - Clinics  - Echocardiogram Exercise Stress; Future

## 2025-03-07 ENCOUNTER — HOSPITAL ENCOUNTER (OUTPATIENT)
Dept: CT IMAGING | Facility: CLINIC | Age: 70
Discharge: HOME | End: 2025-03-07
Attending: NURSE PRACTITIONER | Admitting: GENERAL ACUTE CARE HOSPITAL
Payer: MEDICARE

## 2025-03-07 DIAGNOSIS — E78.5 HYPERLIPIDEMIA LDL GOAL <130: ICD-10-CM

## 2025-03-07 DIAGNOSIS — R94.39 ABNORMAL CARDIOVASCULAR STRESS TEST: ICD-10-CM

## 2025-03-07 LAB
CV CALCIUM SCORE AGATSTON LM: 0
CV CALCIUM SCORING AGATSON LAD: 0
CV CALCIUM SCORING AGATSTON CX: 0
CV CALCIUM SCORING AGATSTON RCA: 0
CV CALCIUM SCORING AGATSTON TOTAL: 0

## 2025-03-07 PROCEDURE — 2894A CT CALCIUM SCREENING: CPT | Mod: 26 | Performed by: GENERAL ACUTE CARE HOSPITAL

## 2025-03-07 PROCEDURE — 75571 CT HRT W/O DYE W/CA TEST: CPT

## 2025-04-09 ENCOUNTER — OFFICE VISIT (OUTPATIENT)
Dept: CARDIOLOGY | Facility: CLINIC | Age: 70
End: 2025-04-09
Attending: NURSE PRACTITIONER
Payer: MEDICARE

## 2025-04-09 VITALS
OXYGEN SATURATION: 99 % | WEIGHT: 138.5 LBS | HEART RATE: 57 BPM | SYSTOLIC BLOOD PRESSURE: 121 MMHG | DIASTOLIC BLOOD PRESSURE: 80 MMHG | HEIGHT: 64 IN | BODY MASS INDEX: 23.64 KG/M2

## 2025-04-09 DIAGNOSIS — Z82.49 FAMILY HISTORY OF AORTIC ANEURYSM: Primary | ICD-10-CM

## 2025-04-09 DIAGNOSIS — I71.21 ASCENDING AORTIC ANEURYSM, UNSPECIFIED WHETHER RUPTURED: ICD-10-CM

## 2025-04-09 DIAGNOSIS — R94.39 ABNORMAL CARDIOVASCULAR STRESS TEST: ICD-10-CM

## 2025-04-09 PROCEDURE — 3079F DIAST BP 80-89 MM HG: CPT | Performed by: INTERNAL MEDICINE

## 2025-04-09 PROCEDURE — 3074F SYST BP LT 130 MM HG: CPT | Performed by: INTERNAL MEDICINE

## 2025-04-09 PROCEDURE — 99204 OFFICE O/P NEW MOD 45 MIN: CPT | Performed by: INTERNAL MEDICINE

## 2025-04-09 RX ORDER — ESTRADIOL 0.1 MG/G
CREAM VAGINAL
COMMUNITY
Start: 2024-05-14

## 2025-04-09 NOTE — PATIENT INSTRUCTIONS
April 9, 2025    Thank you for allowing our Cardiology team to participate in your care.     Please note the following changes to your heart treatment plan:     Medication changes:   - none    Tests to be done:  - coronary CTA  - limited TTE of the aorta  - AAA ultrasound screening    Follow up:  - Follow up as needed      For scheduling, please call [phone].      Please contact our team through [employer] or our Nurse Team Voicemail service [phone] for questions or concerns.     General Clinic [phone]     If you are having a medical emergency, please call 911.     Sincerely,    Victorino Owens MD, FACC  Cardiology    [facility] [place]

## 2025-04-09 NOTE — PROGRESS NOTES
Cardiology Clinic Consultation:    April 9, 2025   Patient Name: Marguerite Garcia  Patient MRN: [id number]    Consult indication: abnormal stress test    HPI:    I had the opportunity to see patient Marguerite Garcia in cardiology clinic for a consultation. Patient is followed by our colleague Rosanna Patrick MD with Primary Care.     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    At baseline patient is quite active, she exercises regularly, denies any recent exertional symptoms of chest pain, chest pressure, abnormal shortness of breath.  She was in her usual state of health until she developed right-sided chest discomfort after eating some fast food, she was assessed with an exercise stress echocardiogram, the study was equivocal, ECG component was borderline abnormal and the imaging component was normal.  Reviewed the study personally and I concur with the reported findings.    Patient has a strong family history of aortopathy with multiple family members with aortic aneurysm.  Patient does not have a history of aortic aneurysm.    CT coronary calcium score 0, 3/7/2025.      Assessment and Plan/Recommendations:    # Atypical chest pain, equivocal stress test  # FH of aortic aneurysm   # RVOT VT, followed by [facility]    - Reviewed prior cardiac history in detail  - Coronary CTA  - Limited TTE for assessment of the aorta, abdominal aortic aneurysm screening ultrasound  - Discussed mild dyslipidemia, borderline meets criteria for statin therapy however CAC score 0, patient would like to hold off pending the CTA findings which I think is reasonable  - Follow-up in cardiology clinic as needed    Thank you for allowing our team to participate in the care of Marguerite Garcia.  Please  do not hesitate to call or page me with any questions or concerns.    Sincerely,     Victorino Owens MD, [facility]  Cardiology  April 9, 2025    cc  Cielo Patel NP  [address] [place] [address]    Voice recognition software utilized.     Total time spent on this encounter today: Greater than 45 minutes, providing care in this encounter including, but not limited to, reviewing prior medical records, laboratory data, imaging studies, diagnostic studies, procedure notes, formulating an assessment and plan, recommendations, discussion and counseling with patient face to face, dictation.    Past Medical History:     Patient Active Problem List   Diagnosis    Healthcare maintenance    Herniated disk    Lateral meniscus tear    Advance care planning    Urge incontinence    Sinus arrhythmia    Post-menopausal bleeding    PSVT (paroxysmal supraventricular tachycardia)       Past Surgical History:   Past Surgical History:   Procedure Laterality Date    ARTHROSCOPY KNEE RT/LT  01/01/2007    1.  Arthroscopic partial lateral meniscectomy, right knee.     BREAST SURGERY  fibroidedenoma '93    COLONOSCOPY Left 10/02/2017    Procedure: COLONOSCOPY;  Colonoscopy ;  Surgeon: Flora Johns MD;  Location:  GI    COSMETIC SURGERY  breast implant 3-2014    LAPAROSCOPIC CHOLECYSTECTOMY WITH CHOLANGIOGRAMS  08/07/2012    Procedure: LAPAROSCOPIC CHOLECYSTECTOMY WITH CHOLANGIOGRAMS;  LAPAROSCOPIC CHOLECYSTECTOMY WITH Intraoperative CHOLANGIOGRAMS ;  Surgeon: Elsi Bruno MD;  Location: [facility] OR    LA TOTAL KNEE ARTHROPLASTY Left 10/07/2020    Z NONSPECIFIC PROCEDURE      Knee Surgery    Z NONSPECIFIC PROCEDURE      Left breast bx    ZZC NONSPECIFIC PROCEDURE      right breast bx       Medications (outpatient):  Current Outpatient Medications   Medication Sig Dispense Refill    Calcium Carbonate (CALCIUM 600 PO) Take 1 tablet by mouth 2 times daily      estradiol (ESTRACE) 0.1 MG/GM vaginal  "cream Insert 1 g every 72 hours by vaginal route for 90 days.      magnesium 250 MG tablet Take 1 tablet (250 mg) by mouth At Bedtime      metoprolol tartrate (LOPRESSOR) 50 MG tablet TAKE 1 TABLET(50 MG) BY MOUTH TWICE DAILY 180 tablet 3    oxybutynin ER (DITROPAN-XL) 5 MG 24 hr tablet Take 1 tablet by mouth daily      Vitamin D, Cholecalciferol, 25 MCG (1000 UT) TABS Take 1 tablet (1,000 Units) by mouth daily      clindamycin (CLEOCIN) 300 MG capsule TAKE 2 CAPSULES 1 HOUR PRIOR TO DENTAL CLEANING / PROCEDURE      UNABLE TO FIND MEDICATION NAME: Estradiol 0.01% vaginal cream         Allergies:  Allergies   Allergen Reactions    Penicillins Rash    Sulfa Antibiotics Rash       Social History:   History   Drug Use No      History   Smoking Status    Never   Smokeless Tobacco    Never     Social History    Substance and Sexual Activity      Alcohol use: Yes        Comment: infrequent glass of wine       Family History:  Family History   Problem Relation Age of Onset    Diabetes Mother     Hypertension Mother     Obesity Mother     Hypertension Father     Lipids Father     Hypertension Sister     Cerebrovascular Disease Sister     Breast Cancer Sister     Thyroid Disease Sister     Obesity Sister     Cerebrovascular Disease Sister     Obesity Sister     Obesity Sister     Thyroid Disease Sister     Cancer - colorectal No family hx of        Review of Systems:   A comprehensive 12 system review of systems was carried out.  Pertinent positives and negatives are noted above. Otherwise negative for contributory information.    Objective & Physical Exam:  /80   Pulse 57   Ht 1.635 m (5' 4.37\")   Wt 62.8 kg (138 lb 8 oz)   LMP  (LMP Unknown)   SpO2 99%   BMI 23.50 kg/m    Wt Readings from Last 2 Encounters:   04/09/25 62.8 kg (138 lb 8 oz)   12/09/24 61.4 kg (135 lb 4.8 oz)     Body mass index is 23.5 kg/m .   Body surface area is 1.69 meters squared.    Constitutional: appears stated age, in no apparent " distress, appears to be well nourished  Pulmonary: clear to auscultation bilaterally, no wheezes, no rales, no increased work of breathing  Cardiovascular: JVP normal, regular rate, regular rhythm, normal S1 and S2, no S3, S4, no murmur appreciated, no lower extremity edema  Gastrointestinal: no guarding, non-rigid   Neurologic: awake, alert, moves all extremities  Skin: no jaundice, warm on limited exam    Data reviewed:  Lab Results   Component Value Date    WBC 10.4 05/16/2022    WBC 7.4 10/01/2020    RBC 4.80 05/16/2022    RBC 4.53 10/01/2020    HGB 14.4 05/16/2022    HGB 12.0 10/14/2020    HCT 43.9 05/16/2022    HCT 41.8 10/01/2020    MCV 92 05/16/2022    MCV 92 10/01/2020    MCH 30.0 05/16/2022    MCH 30.5 10/01/2020    MCHC 32.8 05/16/2022    MCHC 33.0 10/01/2020    RDW 13.1 05/16/2022    RDW 13.0 10/01/2020     05/16/2022     10/01/2020     Sodium   Date Value Ref Range Status   12/09/2024 139 135 - 145 mmol/L Final   10/01/2020 140 133 - 144 mmol/L Final     Comment:     This test is intended for monitoring Coumadin therapy.  Results are not   accurate in patients with prolonged INR due to factor deficiency.       Potassium   Date Value Ref Range Status   12/09/2024 4.1 3.4 - 5.3 mmol/L Final   11/08/2022 4.2 3.4 - 5.3 mmol/L Final   10/01/2020 4.5 3.4 - 5.3 mmol/L Final     Chloride   Date Value Ref Range Status   12/09/2024 100 98 - 107 mmol/L Final   11/08/2022 106 94 - 109 mmol/L Final   10/01/2020 100 94 - 109 mmol/L Final     Carbon Dioxide   Date Value Ref Range Status   10/01/2020 30 20 - 32 mmol/L Final     Carbon Dioxide (CO2)   Date Value Ref Range Status   12/09/2024 29 22 - 29 mmol/L Final   11/08/2022 28 20 - 32 mmol/L Final     Anion Gap   Date Value Ref Range Status   12/09/2024 10 7 - 15 mmol/L Final   11/08/2022 7 3 - 14 mmol/L Final   10/01/2020 10 3 - 14 mmol/L Final     Glucose   Date Value Ref Range Status   12/09/2024 93 70 - 99 mg/dL Final   11/08/2022 91 70 - 99 mg/dL  Final   10/01/2020 91 70 - 99 mg/dL Final     Urea Nitrogen   Date Value Ref Range Status   12/09/2024 21.6 8.0 - 23.0 mg/dL Final   11/08/2022 18 7 - 30 mg/dL Final   10/01/2020 13 7 - 30 mg/dL Final     Creatinine   Date Value Ref Range Status   12/09/2024 1.02 (H) 0.51 - 0.95 mg/dL Final   10/01/2020 1.00 0.52 - 1.04 mg/dL Final     GFR Estimate   Date Value Ref Range Status   12/09/2024 59 (L) >60 mL/min/1.73m2 Final     Comment:     eGFR calculated using 2021 CKD-EPI equation.   10/01/2020 58 (L) >60 mL/min/[1.73_m2] Final     Comment:     Starting 12/18/2018, serum creatinine based estimated GFR (eGFR) will be   calculated using the Chronic Kidney Disease Epidemiology Collaboration   (CKD-EPI) equation.       Calcium   Date Value Ref Range Status   12/09/2024 9.5 8.8 - 10.4 mg/dL Final     Comment:     Reference intervals for this test were updated on 7/16/2024 to reflect our healthy population more accurately. There may be differences in the flagging of prior results with similar values performed with this method. Those prior results can be interpreted in the context of the updated reference intervals.   10/01/2020 9.8 8.5 - 10.1 mg/dL Final     Bilirubin Total   Date Value Ref Range Status   11/30/2023 0.7 <=1.2 mg/dL Final   05/07/2015 0.8 0.2 - 1.3 mg/dL Final     Alkaline Phosphatase   Date Value Ref Range Status   11/30/2023 89 40 - 150 U/L Final     Comment:     Reference intervals for this test were updated on 11/14/2023 to more accurately reflect our healthy population. There may be differences in the flagging of prior results with similar values performed with this method. Interpretation of those prior results can be made in the context of the updated reference intervals.   05/07/2015 76 40 - 150 U/L Final     ALT   Date Value Ref Range Status   11/30/2023 18 0 - 50 U/L Final     Comment:     Reference intervals for this test were updated on 6/12/2023 to more accurately reflect our healthy  population. There may be differences in the flagging of prior results with similar values performed with this method. Interpretation of those prior results can be made in the context of the updated reference intervals.     2015 20 0 - 50 U/L Final     AST   Date Value Ref Range Status   2023 25 0 - 45 U/L Final     Comment:     Reference intervals for this test were updated on 2023 to more accurately reflect our healthy population. There may be differences in the flagging of prior results with similar values performed with this method. Interpretation of those prior results can be made in the context of the updated reference intervals.   2015 13 0 - 45 U/L Final     Recent Labs   Lab Test 24  1448 23  1410   CHOL 249* 213*   HDL 79 60   * 133*   TRIG 94 102      Lab Results   Component Value Date    A1C 5.6 2024    A1C 5.6 2023        Recent Results (from the past 4320 hours)   Echocardiogram Exercise Stress    Narrative    [id number]  [id number]  [id number]  [id number]^KAT^SOURAV     [facility]  Echocardiography Laboratory  [address] [place] [address]     Name: SUZI POWERS  MRN: [id number]  : 1955  Study Date: 2025 09:01 AM  Age: 69 yrs  Gender: Female  Patient Location: [facility]  Reason For Study: Chest pressure  History: Family History  Ordering Physician: SOURAV STEPHENS  Referring Physician: SOURAV STEPHENS  Performed By: Gina Yo RDCS     BSA: 1.7 m2  Height: 64 in  Weight: 135 lb  HR: 113  BP: 126/84 mmHg  ______________________________________________________________________________  Procedure  Stress Echo Treadmill with two dimensional, color and spectral Doppler.  ______________________________________________________________________________  Interpretation Summary  The patient exercised 9 minutes on the treadmill. She did not have typical  symptoms of angina but had dyspnea on exertion.     Patient had 1 mm  of inferior inferolateral ST segment depression which was  flat to upsloping along with 1 mm ST elevation in aVR in early recovery. Alfaro  treadmill score was intermediate risk.     Normal wall with rest and stress. No contrast was used.     Given ECG changes, consider coronary CT angiography if clinically appropriate  to rule out underlying obstructive coronary artery disease if further concerns  for obstructive coronary artery disease.  ______________________________________________________________________________  Stress  The patient exercised 9:00.  RPP [id number].  This study was stopped as the patient achieved an adequate exercise effort for  a diagnostic study.  The patient did not exhibit any symptoms during exercise.  There was a normal BP response to exercise.  Target Heart Rate was achieved.  This was an abnormal stress EKG.  The Duke treadmill score was intermediate risk ( -11< Alfaro score <5).  Normal left ventricular function and wall motion at rest and post-stress.     Baseline  The patient is in normal sinus rhythm.     Stress Results             Protocol:  Aric          Maximum Predicted HR:   151 bpm             Target HR: 128 bpm        % Maximum Predicted HR: 110 %                  Stage  DurationHeart Rate  BP          Comment                      (mm:ss)   (bpm)              Stage 1   3:00     139    138/74Duke Treadmill Score: 4              Stage 2   3:00     155    142/74FAC: Above Average              Stage 3   3:00     166    156/74             RecoveryR  6:00     121    120/80            Stress Duration:   9:00 mm:ss *        Recovery Time: 6:00 mm:ss         Maximum Stress HR: 166 bpm *           METS:          10     Left Ventricle  The left ventricle is normal in size. Left ventricular systolic function is  normal.     Right Ventricle  The right ventricle is mildly dilated. The right ventricle is not well  visualized. The right ventricular systolic function is normal.     Aortic Valve  The  aortic valve is normal in structure and function.     Pericardium  There is no pericardial effusion.  ______________________________________________________________________________  Doppler Measurements & Calculations  TR max ivanna: 243.3 cm/sec  TR max P.7 mmHg     ______________________________________________________________________________  Report approved by: John Chavez MD on 2025 10:55 AM

## 2025-04-09 NOTE — LETTER
4/9/2025    Rosanna Patrick MD  [address] [facility] [address]    RE: Marguerite Garcia       Dear Colleague,     I had the pleasure of seeing Marguerite Garcia in the [facility].      Cardiology Clinic Consultation:    April 9, 2025   Patient Name: Marguerite Garcia  Patient MRN: [id number]    Consult indication: abnormal stress test    HPI:    I had the opportunity to see patient Marguerite Garcia in cardiology clinic for a consultation. Patient is followed by our colleague Rosanna Patrick MD with Primary Care.     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    At baseline patient is quite active, she exercises regularly, denies any recent exertional symptoms of chest pain, chest pressure, abnormal shortness of breath.  She was in her usual state of health until she developed right-sided chest discomfort after eating some fast food, she was assessed with an exercise stress echocardiogram, the study was equivocal, ECG component was borderline abnormal and the imaging component was normal.  Reviewed the study personally and I concur with the reported findings.    Patient has a strong family history of aortopathy with multiple family members with aortic aneurysm.  Patient does not have a history of aortic aneurysm.    CT coronary calcium score 0, 3/7/2025.      Assessment and Plan/Recommendations:    # Atypical chest pain, equivocal stress test  # FH of aortic aneurysm   # RVOT VT, followed by [facility]    - Reviewed prior cardiac history in detail  - Coronary CTA  - Limited TTE for assessment of the aorta, abdominal aortic aneurysm screening ultrasound  - Discussed mild dyslipidemia, borderline meets criteria for statin therapy however CAC score 0, patient  would like to hold off pending the CTA findings which I think is reasonable  - Follow-up in cardiology clinic as needed    Thank you for allowing our team to participate in the care of Marguerite Garcia.  Please do not hesitate to call or page me with any questions or concerns.    Sincerely,     Victorino Owens MD, [facility]  Cardiology  April 9, 2025    cc  Cielo Patel, SJ  [address] [address]    Voice recognition software utilized.     Total time spent on this encounter today: Greater than 45 minutes, providing care in this encounter including, but not limited to, reviewing prior medical records, laboratory data, imaging studies, diagnostic studies, procedure notes, formulating an assessment and plan, recommendations, discussion and counseling with patient face to face, dictation.    Past Medical History:     Patient Active Problem List   Diagnosis     Healthcare maintenance     Herniated disk     Lateral meniscus tear     Advance care planning     Urge incontinence     Sinus arrhythmia     Post-menopausal bleeding     PSVT (paroxysmal supraventricular tachycardia)       Past Surgical History:   Past Surgical History:   Procedure Laterality Date     ARTHROSCOPY KNEE RT/LT  01/01/2007    1.  Arthroscopic partial lateral meniscectomy, right knee.      BREAST SURGERY  fibroidedenoma '93     COLONOSCOPY Left 10/02/2017    Procedure: COLONOSCOPY;  Colonoscopy ;  Surgeon: Flora Johns MD;  Location:  GI     COSMETIC SURGERY  breast implant 3-2014     LAPAROSCOPIC CHOLECYSTECTOMY WITH CHOLANGIOGRAMS  08/07/2012    Procedure: LAPAROSCOPIC CHOLECYSTECTOMY WITH CHOLANGIOGRAMS;  LAPAROSCOPIC CHOLECYSTECTOMY WITH Intraoperative CHOLANGIOGRAMS ;  Surgeon: Elsi Bruno MD;  Location: [facility] OR     OH TOTAL KNEE ARTHROPLASTY Left 10/07/2020     Z NONSPECIFIC PROCEDURE      Knee Surgery     ZZC NONSPECIFIC PROCEDURE      Left breast bx     ZZC NONSPECIFIC PROCEDURE       "right breast bx       Medications (outpatient):  Current Outpatient Medications   Medication Sig Dispense Refill     Calcium Carbonate (CALCIUM 600 PO) Take 1 tablet by mouth 2 times daily       estradiol (ESTRACE) 0.1 MG/GM vaginal cream Insert 1 g every 72 hours by vaginal route for 90 days.       magnesium 250 MG tablet Take 1 tablet (250 mg) by mouth At Bedtime       metoprolol tartrate (LOPRESSOR) 50 MG tablet TAKE 1 TABLET(50 MG) BY MOUTH TWICE DAILY 180 tablet 3     oxybutynin ER (DITROPAN-XL) 5 MG 24 hr tablet Take 1 tablet by mouth daily       Vitamin D, Cholecalciferol, 25 MCG (1000 UT) TABS Take 1 tablet (1,000 Units) by mouth daily       clindamycin (CLEOCIN) 300 MG capsule TAKE 2 CAPSULES 1 HOUR PRIOR TO DENTAL CLEANING / PROCEDURE       UNABLE TO FIND MEDICATION NAME: Estradiol 0.01% vaginal cream         Allergies:  Allergies   Allergen Reactions     Penicillins Rash     Sulfa Antibiotics Rash       Social History:   History   Drug Use No      History   Smoking Status     Never   Smokeless Tobacco     Never     Social History    Substance and Sexual Activity      Alcohol use: Yes        Comment: infrequent glass of wine       Family History:  Family History   Problem Relation Age of Onset     Diabetes Mother      Hypertension Mother      Obesity Mother      Hypertension Father      Lipids Father      Hypertension Sister      Cerebrovascular Disease Sister      Breast Cancer Sister      Thyroid Disease Sister      Obesity Sister      Cerebrovascular Disease Sister      Obesity Sister      Obesity Sister      Thyroid Disease Sister      Cancer - colorectal No family hx of        Review of Systems:   A comprehensive 12 system review of systems was carried out.  Pertinent positives and negatives are noted above. Otherwise negative for contributory information.    Objective & Physical Exam:  /80   Pulse 57   Ht 1.635 m (5' 4.37\")   Wt 62.8 kg (138 lb 8 oz)   LMP  (LMP Unknown)   SpO2 99%   BMI " 23.50 kg/m    Wt Readings from Last 2 Encounters:   04/09/25 62.8 kg (138 lb 8 oz)   12/09/24 61.4 kg (135 lb 4.8 oz)     Body mass index is 23.5 kg/m .   Body surface area is 1.69 meters squared.    Constitutional: appears stated age, in no apparent distress, appears to be well nourished  Pulmonary: clear to auscultation bilaterally, no wheezes, no rales, no increased work of breathing  Cardiovascular: JVP normal, regular rate, regular rhythm, normal S1 and S2, no S3, S4, no murmur appreciated, no lower extremity edema  Gastrointestinal: no guarding, non-rigid   Neurologic: awake, alert, moves all extremities  Skin: no jaundice, warm on limited exam    Data reviewed:  Lab Results   Component Value Date    WBC 10.4 05/16/2022    WBC 7.4 10/01/2020    RBC 4.80 05/16/2022    RBC 4.53 10/01/2020    HGB 14.4 05/16/2022    HGB 12.0 10/14/2020    HCT 43.9 05/16/2022    HCT 41.8 10/01/2020    MCV 92 05/16/2022    MCV 92 10/01/2020    MCH 30.0 05/16/2022    MCH 30.5 10/01/2020    MCHC 32.8 05/16/2022    MCHC 33.0 10/01/2020    RDW 13.1 05/16/2022    RDW 13.0 10/01/2020     05/16/2022     10/01/2020     Sodium   Date Value Ref Range Status   12/09/2024 139 135 - 145 mmol/L Final   10/01/2020 140 133 - 144 mmol/L Final     Comment:     This test is intended for monitoring Coumadin therapy.  Results are not   accurate in patients with prolonged INR due to factor deficiency.       Potassium   Date Value Ref Range Status   12/09/2024 4.1 3.4 - 5.3 mmol/L Final   11/08/2022 4.2 3.4 - 5.3 mmol/L Final   10/01/2020 4.5 3.4 - 5.3 mmol/L Final     Chloride   Date Value Ref Range Status   12/09/2024 100 98 - 107 mmol/L Final   11/08/2022 106 94 - 109 mmol/L Final   10/01/2020 100 94 - 109 mmol/L Final     Carbon Dioxide   Date Value Ref Range Status   10/01/2020 30 20 - 32 mmol/L Final     Carbon Dioxide (CO2)   Date Value Ref Range Status   12/09/2024 29 22 - 29 mmol/L Final   11/08/2022 28 20 - 32 mmol/L Final      Anion Gap   Date Value Ref Range Status   12/09/2024 10 7 - 15 mmol/L Final   11/08/2022 7 3 - 14 mmol/L Final   10/01/2020 10 3 - 14 mmol/L Final     Glucose   Date Value Ref Range Status   12/09/2024 93 70 - 99 mg/dL Final   11/08/2022 91 70 - 99 mg/dL Final   10/01/2020 91 70 - 99 mg/dL Final     Urea Nitrogen   Date Value Ref Range Status   12/09/2024 21.6 8.0 - 23.0 mg/dL Final   11/08/2022 18 7 - 30 mg/dL Final   10/01/2020 13 7 - 30 mg/dL Final     Creatinine   Date Value Ref Range Status   12/09/2024 1.02 (H) 0.51 - 0.95 mg/dL Final   10/01/2020 1.00 0.52 - 1.04 mg/dL Final     GFR Estimate   Date Value Ref Range Status   12/09/2024 59 (L) >60 mL/min/1.73m2 Final     Comment:     eGFR calculated using 2021 CKD-EPI equation.   10/01/2020 58 (L) >60 mL/min/[1.73_m2] Final     Comment:     Starting 12/18/2018, serum creatinine based estimated GFR (eGFR) will be   calculated using the Chronic Kidney Disease Epidemiology Collaboration   (CKD-EPI) equation.       Calcium   Date Value Ref Range Status   12/09/2024 9.5 8.8 - 10.4 mg/dL Final     Comment:     Reference intervals for this test were updated on 7/16/2024 to reflect our healthy population more accurately. There may be differences in the flagging of prior results with similar values performed with this method. Those prior results can be interpreted in the context of the updated reference intervals.   10/01/2020 9.8 8.5 - 10.1 mg/dL Final     Bilirubin Total   Date Value Ref Range Status   11/30/2023 0.7 <=1.2 mg/dL Final   05/07/2015 0.8 0.2 - 1.3 mg/dL Final     Alkaline Phosphatase   Date Value Ref Range Status   11/30/2023 89 40 - 150 U/L Final     Comment:     Reference intervals for this test were updated on 11/14/2023 to more accurately reflect our healthy population. There may be differences in the flagging of prior results with similar values performed with this method. Interpretation of those prior results can be made in the context of the  updated reference intervals.   2015 76 40 - 150 U/L Final     ALT   Date Value Ref Range Status   2023 18 0 - 50 U/L Final     Comment:     Reference intervals for this test were updated on 2023 to more accurately reflect our healthy population. There may be differences in the flagging of prior results with similar values performed with this method. Interpretation of those prior results can be made in the context of the updated reference intervals.     2015 20 0 - 50 U/L Final     AST   Date Value Ref Range Status   2023 25 0 - 45 U/L Final     Comment:     Reference intervals for this test were updated on 2023 to more accurately reflect our healthy population. There may be differences in the flagging of prior results with similar values performed with this method. Interpretation of those prior results can be made in the context of the updated reference intervals.   2015 13 0 - 45 U/L Final     Recent Labs   Lab Test 24  1448 23  1410   CHOL 249* 213*   HDL 79 60   * 133*   TRIG 94 102      Lab Results   Component Value Date    A1C 5.6 2024    A1C 5.6 2023        Recent Results (from the past 4320 hours)   Echocardiogram Exercise Stress    Narrative    [id number]  [id number]  [id number]  [id number]^KAT^SOURAV     [facility]  Echocardiography Laboratory  [address] [place] [address]     Name: SUZI POWERS  MRN: [id number]  : 1955  Study Date: 2025 09:01 AM  Age: 69 yrs  Gender: Female  Patient Location: [facility]  Reason For Study: Chest pressure  History: Family History  Ordering Physician: SOURAV STEPHENS  Referring Physician: SOURAV STEPHENS  Performed By: Gina Yo RDCS     BSA: 1.7 m2  Height: 64 in  Weight: 135 lb  HR: 113  BP: 126/84 mmHg  ______________________________________________________________________________  Procedure  Stress Echo Treadmill with two dimensional, color and spectral  Doppler.  ______________________________________________________________________________  Interpretation Summary  The patient exercised 9 minutes on the treadmill. She did not have typical  symptoms of angina but had dyspnea on exertion.     Patient had 1 mm of inferior inferolateral ST segment depression which was  flat to upsloping along with 1 mm ST elevation in aVR in early recovery. Alfaro  treadmill score was intermediate risk.     Normal wall with rest and stress. No contrast was used.     Given ECG changes, consider coronary CT angiography if clinically appropriate  to rule out underlying obstructive coronary artery disease if further concerns  for obstructive coronary artery disease.  ______________________________________________________________________________  Stress  The patient exercised 9:00.  RPP [id number].  This study was stopped as the patient achieved an adequate exercise effort for  a diagnostic study.  The patient did not exhibit any symptoms during exercise.  There was a normal BP response to exercise.  Target Heart Rate was achieved.  This was an abnormal stress EKG.  The Duke treadmill score was intermediate risk ( -11< Alfaro score <5).  Normal left ventricular function and wall motion at rest and post-stress.     Baseline  The patient is in normal sinus rhythm.     Stress Results             Protocol:  Aric          Maximum Predicted HR:   151 bpm             Target HR: 128 bpm        % Maximum Predicted HR: 110 %                  Stage  DurationHeart Rate  BP          Comment                      (mm:ss)   (bpm)              Stage 1   3:00     139    138/74Duke Treadmill Score: 4              Stage 2   3:00     155    142/74FAC: Above Average              Stage 3   3:00     166    156/74             RecoveryR  6:00     121    120/80            Stress Duration:   9:00 mm:ss *        Recovery Time: 6:00 mm:ss         Maximum Stress HR: 166 bpm *           METS:          10     Left Ventricle  The  left ventricle is normal in size. Left ventricular systolic function is  normal.     Right Ventricle  The right ventricle is mildly dilated. The right ventricle is not well  visualized. The right ventricular systolic function is normal.     Aortic Valve  The aortic valve is normal in structure and function.     Pericardium  There is no pericardial effusion.  ______________________________________________________________________________  Doppler Measurements & Calculations  TR max ivanna: 243.3 cm/sec  TR max P.7 mmHg     ______________________________________________________________________________  Report approved by: John Chavez MD on 2025 10:55 AM              Thank you for allowing me to participate in the care of your patient.      Sincerely,     Victorino Owens MD     [facility]  cc:   Cielo Patel, NP  [address] [address]
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
## 2025-05-10 ENCOUNTER — TRANSFERRED RECORDS (OUTPATIENT)
Dept: HEALTH INFORMATION MANAGEMENT | Facility: CLINIC | Age: 70
End: 2025-05-10
Payer: MEDICARE

## 2025-05-13 ENCOUNTER — RESULTS FOLLOW-UP (OUTPATIENT)
Dept: CARDIOLOGY | Facility: CLINIC | Age: 70
End: 2025-05-13

## 2025-05-13 ENCOUNTER — HOSPITAL ENCOUNTER (OUTPATIENT)
Dept: CARDIOLOGY | Facility: CLINIC | Age: 70
Discharge: HOME | End: 2025-05-13
Attending: INTERNAL MEDICINE
Payer: MEDICARE

## 2025-05-13 ENCOUNTER — HOSPITAL ENCOUNTER (OUTPATIENT)
Dept: ULTRASOUND IMAGING | Facility: CLINIC | Age: 70
Discharge: HOME | End: 2025-05-13
Attending: INTERNAL MEDICINE
Payer: MEDICARE

## 2025-05-13 DIAGNOSIS — I71.21 ASCENDING AORTIC ANEURYSM, UNSPECIFIED WHETHER RUPTURED: ICD-10-CM

## 2025-05-13 DIAGNOSIS — Z82.49 FAMILY HISTORY OF AORTIC ANEURYSM: ICD-10-CM

## 2025-05-13 LAB — LVEF ECHO: NORMAL

## 2025-05-13 PROCEDURE — 76706 US ABDL AORTA SCREEN AAA: CPT

## 2025-05-13 PROCEDURE — 93308 TTE F-UP OR LMTD: CPT

## 2025-05-15 ENCOUNTER — TELEPHONE (OUTPATIENT)
Dept: CARDIOLOGY | Facility: CLINIC | Age: 70
End: 2025-05-15
Payer: MEDICARE

## 2025-05-15 NOTE — RESULT ENCOUNTER NOTE
Results reviewed, please let the patient know that overall findings are reassuring, no abdominal aortic aneurysm.   Workup as planned. If reassuring, should just follow up with [place] cardiology as per their clinic for the RVOT VT thanks

## 2025-05-15 NOTE — TELEPHONE ENCOUNTER
Regarding results of Aortic US:    Victorino Owens MD to [facility] Heart Team 2  5/15/25  7:46 AM    Results reviewed, please let the patient know that overall findings are reassuring, no abdominal aortic aneurysm.   Workup as planned. If reassuring, should just follow up with [place] cardiology as per their clinic for the RVOT VT thanks        Regarding results of echocardiogram:    Victorino Owens MD to [facility] Heart Team 2 5/15/25  7:46 AM    Normal cardiac structure and function        ===========================================    Patient called to inform that Dr. Owens has reviewed the results of her aortic US and echocardiogram and above interpretations and recommendation. 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    Patient has many questions regrading testing, plan of care and medication management. All questions answered. Patient appreciates call and verbalizes understanding. Will await Dr. Owens's review of Coronary CTA.

## 2025-05-23 ENCOUNTER — HOSPITAL ENCOUNTER (OUTPATIENT)
Dept: CARDIOLOGY | Facility: CLINIC | Age: 70
Discharge: HOME | End: 2025-05-23
Attending: INTERNAL MEDICINE | Admitting: INTERNAL MEDICINE
Payer: MEDICARE

## 2025-05-23 ENCOUNTER — RESULTS FOLLOW-UP (OUTPATIENT)
Dept: CARDIOLOGY | Facility: CLINIC | Age: 70
End: 2025-05-23

## 2025-05-23 VITALS — SYSTOLIC BLOOD PRESSURE: 128 MMHG | DIASTOLIC BLOOD PRESSURE: 82 MMHG | HEART RATE: 57 BPM

## 2025-05-23 DIAGNOSIS — R94.39 ABNORMAL CARDIOVASCULAR STRESS TEST: ICD-10-CM

## 2025-05-23 PROCEDURE — 75574 CT ANGIO HRT W/3D IMAGE: CPT | Mod: 26 | Performed by: INTERNAL MEDICINE

## 2025-05-23 PROCEDURE — 250N000011 HC RX IP 250 OP 636: Performed by: INTERNAL MEDICINE

## 2025-05-23 PROCEDURE — 75574 CT ANGIO HRT W/3D IMAGE: CPT

## 2025-05-23 PROCEDURE — 250N000013 HC RX MED GY IP 250 OP 250 PS 637: Performed by: INTERNAL MEDICINE

## 2025-05-23 RX ORDER — DILTIAZEM HCL 60 MG
120 TABLET ORAL
Status: DISCONTINUED | OUTPATIENT
Start: 2025-05-23 | End: 2025-05-24 | Stop reason: HOSPADM

## 2025-05-23 RX ORDER — IOPAMIDOL 755 MG/ML
500 INJECTION, SOLUTION INTRAVASCULAR ONCE
Status: COMPLETED | OUTPATIENT
Start: 2025-05-23 | End: 2025-05-23

## 2025-05-23 RX ORDER — LIDOCAINE 40 MG/G
CREAM TOPICAL
Status: DISCONTINUED | OUTPATIENT
Start: 2025-05-23 | End: 2025-05-24 | Stop reason: HOSPADM

## 2025-05-23 RX ORDER — METOPROLOL TARTRATE 25 MG/1
25-100 TABLET, FILM COATED ORAL
Status: DISCONTINUED | OUTPATIENT
Start: 2025-05-23 | End: 2025-05-24 | Stop reason: HOSPADM

## 2025-05-23 RX ORDER — METOPROLOL TARTRATE 1 MG/ML
5-20 INJECTION, SOLUTION INTRAVENOUS
Status: DISCONTINUED | OUTPATIENT
Start: 2025-05-23 | End: 2025-05-24 | Stop reason: HOSPADM

## 2025-05-23 RX ORDER — NITROGLYCERIN 0.4 MG/1
0.4 TABLET SUBLINGUAL
Status: DISCONTINUED | OUTPATIENT
Start: 2025-05-23 | End: 2025-05-24 | Stop reason: HOSPADM

## 2025-05-23 RX ORDER — IVABRADINE 5 MG/1
5-15 TABLET, FILM COATED ORAL
Status: DISCONTINUED | OUTPATIENT
Start: 2025-05-23 | End: 2025-05-24 | Stop reason: HOSPADM

## 2025-05-23 RX ORDER — ONDANSETRON 2 MG/ML
4 INJECTION INTRAMUSCULAR; INTRAVENOUS
Status: DISCONTINUED | OUTPATIENT
Start: 2025-05-23 | End: 2025-05-24 | Stop reason: HOSPADM

## 2025-05-23 RX ORDER — DILTIAZEM HYDROCHLORIDE 5 MG/ML
10-15 INJECTION INTRAVENOUS
Status: DISCONTINUED | OUTPATIENT
Start: 2025-05-23 | End: 2025-05-24 | Stop reason: HOSPADM

## 2025-05-23 RX ADMIN — NITROGLYCERIN 0.4 MG: 0.4 TABLET SUBLINGUAL at 11:24

## 2025-05-23 RX ADMIN — IOPAMIDOL 110 ML: 755 INJECTION, SOLUTION INTRAVENOUS at 11:40

## 2025-05-26 NOTE — RESULT ENCOUNTER NOTE
Results reviewed, please let the patient know that overall findings are reassuring, normal coronary anatomy without findings suggestive of atherosclerosis/CAD.  Calcium score is 0.  Reasonable to hold off on statin therapy in favor of continued focus on healthy lifestyle habits.  Can follow-up with PCP and her [place] cardiology team thanks

## (undated) DEVICE — KIT ENDO TURNOVER/PROCEDURE W/CLEAN A SCOPE LINERS 103888

## (undated) RX ORDER — ONDANSETRON 2 MG/ML
INJECTION INTRAMUSCULAR; INTRAVENOUS
Status: DISPENSED
Start: 2017-10-02

## (undated) RX ORDER — FENTANYL CITRATE 50 UG/ML
INJECTION, SOLUTION INTRAMUSCULAR; INTRAVENOUS
Status: DISPENSED
Start: 2017-10-02